# Patient Record
Sex: FEMALE | Race: WHITE | NOT HISPANIC OR LATINO | Employment: FULL TIME | URBAN - METROPOLITAN AREA
[De-identification: names, ages, dates, MRNs, and addresses within clinical notes are randomized per-mention and may not be internally consistent; named-entity substitution may affect disease eponyms.]

---

## 2018-01-15 NOTE — MISCELLANEOUS
Message   Recorded as Task   Date: 02/08/2016 01:25 PM, Created By: Aziza Dover   Task Name: Med Renewal Request   Assigned To: Eulalio Slade   Regarding Patient: Luisito Lopez, Status: Active   Comment:    LeonelbatoolMargie marshall - 08 Feb 2016 1:25 PM     TASK CREATED  Caller: Self; Renew Medication; [phone] (Home); [phone] (Work)  Refill Levothyroxine 125mcg to [facility] -- call [phone] if ? Morelia Castillo - 08 Feb 2016 5:43 PM     TASK REASSIGNED: Previously Assigned To [address]  I sent him 30 days worth of the prescription the patient is due for a checkup and blood work  She's not been seen here in a year  02/08/2016 Has no insurance till June, she will try to schedule before, not sure if she will be able to afford it  trb      Active Problems    1  Graves' disease (242 00) (E05 00)   2  Graves' disease with exophthalmos (242 00,376 21) (E05 00)   3  Hypertension (401 9) (I10)   4  Hypothyroidism (244 9) (E03 9)   5  Nicotine dependence (305 1) (F17 200)    Current Meds   1  Atenolol 25 MG Oral Tablet; TAKE 1 TABLET DAILY AS DIRECTED; Therapy: [id number] to ([facility]ruits)  Requested for: [id number]; Last   Rx:[id number] Ordered   2  Hydrochlorothiazide 25 MG Oral Tablet; TAKE 1 TABLET DAILY; Therapy: [id number] to ([facility]ruits)  Requested for: 49HRM8464; Last   Rx:[id number] Ordered   3  Levothyroxine Sodium 125 MCG Oral Tablet (Synthroid); TAKE 1 TABLET DAILY AS   DIRECTED; Therapy: [id number] to (Hadley Ferrari)  Requested for: 56JWA0466; Last   Rx:[id number] Ordered    Allergies    1  No Known Drug Allergies    Signatures   Electronically signed by :  Tremaine Wheatley, ; Feb 8 2016  6:10PM EST                       (Author)

## 2018-05-07 DIAGNOSIS — I10 HYPERTENSION, ESSENTIAL: Primary | ICD-10-CM

## 2018-05-07 DIAGNOSIS — E03.9 HYPOTHYROIDISM, UNSPECIFIED TYPE: ICD-10-CM

## 2018-05-07 RX ORDER — LEVOTHYROXINE SODIUM 0.12 MG/1
1 TABLET ORAL DAILY
COMMUNITY
Start: 2012-01-23 | End: 2018-05-07 | Stop reason: SDUPTHER

## 2018-05-07 RX ORDER — ATENOLOL 25 MG/1
1 TABLET ORAL DAILY
COMMUNITY
Start: 2012-01-23 | End: 2018-05-07 | Stop reason: SDUPTHER

## 2018-05-07 RX ORDER — ATENOLOL 25 MG/1
25 TABLET ORAL DAILY
Qty: 90 TABLET | Refills: 1 | Status: SHIPPED | OUTPATIENT
Start: 2018-05-07 | End: 2018-11-12 | Stop reason: SDUPTHER

## 2018-05-07 RX ORDER — HYDROCHLOROTHIAZIDE 25 MG/1
25 TABLET ORAL DAILY
Qty: 90 TABLET | Refills: 1 | Status: SHIPPED | OUTPATIENT
Start: 2018-05-07 | End: 2018-11-12 | Stop reason: SDUPTHER

## 2018-05-07 RX ORDER — LEVOTHYROXINE SODIUM 0.12 MG/1
125 TABLET ORAL DAILY
Qty: 90 TABLET | Refills: 1 | Status: SHIPPED | OUTPATIENT
Start: 2018-05-07 | End: 2018-11-12 | Stop reason: SDUPTHER

## 2018-05-07 RX ORDER — HYDROCHLOROTHIAZIDE 25 MG/1
1 TABLET ORAL DAILY
COMMUNITY
Start: 2012-01-23 | End: 2018-05-07 | Stop reason: SDUPTHER

## 2018-11-12 ENCOUNTER — TELEPHONE (OUTPATIENT)
Dept: FAMILY MEDICINE CLINIC | Facility: CLINIC | Age: 58
End: 2018-11-12

## 2018-11-12 DIAGNOSIS — I10 HYPERTENSION, ESSENTIAL: ICD-10-CM

## 2018-11-12 DIAGNOSIS — E03.9 HYPOTHYROIDISM, UNSPECIFIED TYPE: ICD-10-CM

## 2018-11-12 RX ORDER — LEVOTHYROXINE SODIUM 0.12 MG/1
125 TABLET ORAL DAILY
Qty: 30 TABLET | Refills: 0 | Status: SHIPPED | OUTPATIENT
Start: 2018-11-12 | End: 2018-12-05 | Stop reason: SDUPTHER

## 2018-11-12 RX ORDER — ATENOLOL 25 MG/1
25 TABLET ORAL DAILY
Qty: 30 TABLET | Refills: 0 | Status: SHIPPED | OUTPATIENT
Start: 2018-11-12 | End: 2018-12-05 | Stop reason: SDUPTHER

## 2018-11-12 RX ORDER — HYDROCHLOROTHIAZIDE 25 MG/1
25 TABLET ORAL DAILY
Qty: 30 TABLET | Refills: 0 | Status: SHIPPED | OUTPATIENT
Start: 2018-11-12 | End: 2018-12-05 | Stop reason: SDUPTHER

## 2018-11-12 NOTE — TELEPHONE ENCOUNTER
Called patient left message on machine, will request 30 day but needs to call office to schedule an appointment, has not been seen for 2 years  She will need to schedule before she gets anymore refills    katie

## 2018-11-12 NOTE — TELEPHONE ENCOUNTER
PT SAID SHE LEFT THE REQUEST ON THE RX LINE Friday AM AND NOTHING WAS CALLED IN TO MARTHA VENEGAS PT NEEDS ATENOLOL 25 MG AND HYDROCHLOROTHIAZIDE 25 MG AND LEVOTHYROXINE 125 MCG  ONLY HAS FOR TOMORROW

## 2018-12-05 ENCOUNTER — OFFICE VISIT (OUTPATIENT)
Dept: FAMILY MEDICINE CLINIC | Facility: CLINIC | Age: 58
End: 2018-12-05
Payer: COMMERCIAL

## 2018-12-05 VITALS
BODY MASS INDEX: 32.01 KG/M2 | DIASTOLIC BLOOD PRESSURE: 90 MMHG | HEART RATE: 68 BPM | SYSTOLIC BLOOD PRESSURE: 140 MMHG | WEIGHT: 211.2 LBS | OXYGEN SATURATION: 98 % | HEIGHT: 68 IN | RESPIRATION RATE: 14 BRPM | TEMPERATURE: 97.9 F

## 2018-12-05 DIAGNOSIS — F17.200 NICOTINE DEPENDENCE, UNCOMPLICATED, UNSPECIFIED NICOTINE PRODUCT TYPE: ICD-10-CM

## 2018-12-05 DIAGNOSIS — E66.9 OBESITY (BMI 30.0-34.9): ICD-10-CM

## 2018-12-05 DIAGNOSIS — I10 ESSENTIAL HYPERTENSION: Primary | ICD-10-CM

## 2018-12-05 DIAGNOSIS — E89.0 POSTOPERATIVE HYPOTHYROIDISM: ICD-10-CM

## 2018-12-05 DIAGNOSIS — E78.5 MILD HYPERLIPIDEMIA: ICD-10-CM

## 2018-12-05 PROCEDURE — 3008F BODY MASS INDEX DOCD: CPT | Performed by: FAMILY MEDICINE

## 2018-12-05 PROCEDURE — 99214 OFFICE O/P EST MOD 30 MIN: CPT | Performed by: FAMILY MEDICINE

## 2018-12-05 RX ORDER — ATENOLOL 50 MG/1
50 TABLET ORAL DAILY
Qty: 30 TABLET | Refills: 5 | Status: SHIPPED | OUTPATIENT
Start: 2018-12-05 | End: 2019-06-04 | Stop reason: SDUPTHER

## 2018-12-05 RX ORDER — HYDROCHLOROTHIAZIDE 25 MG/1
25 TABLET ORAL DAILY
Qty: 30 TABLET | Refills: 5 | Status: SHIPPED | OUTPATIENT
Start: 2018-12-05 | End: 2019-06-04 | Stop reason: SDUPTHER

## 2018-12-05 RX ORDER — LEVOTHYROXINE SODIUM 0.12 MG/1
125 TABLET ORAL DAILY
Qty: 30 TABLET | Refills: 5 | Status: SHIPPED | OUTPATIENT
Start: 2018-12-05 | End: 2019-06-04 | Stop reason: SDUPTHER

## 2018-12-05 NOTE — PROGRESS NOTES
Assessment/Plan:  Hypertension  The patient's blood pressure is not controlled on her current medication  Therefore, I am going to increase her atenolol to 50 mg daily  She will take 2 for 25 mg tablets once daily  I advised her to purchase a blood pressure cuff to monitor her blood pressure at home  She is overdue for lab work and will go for the testing as ordered as soon as possible  We will follow up closely with the results and will see her back as scheduled in 1 month  Postoperative hypothyroidism  The patient will continue with her levothyroxine  She will go for the up-to-date lab work as ordered  Will follow up closely with the results  Karel Galeazzi is refusing a screening mammogram   She is aware the risks of not having breast cancer screening including missing pre cancerous lesions that could result in development of breast cancer and possibly death  She is aware and still refuses the screening  Karel Galeazzi is refusing a colonoscopy, stool testing, or any type of colon cancer screening  She is aware of the risks of not screening including missing a pre cancerous polyp that could lead to the development of colon cancer and complications including death  She is aware of this and is still declining a colonoscopy  Diagnoses and all orders for this visit:    Essential hypertension  -     CBC and differential; Future  -     Comprehensive metabolic panel; Future  -     LDL cholesterol, direct; Future  -     Lipid panel; Future  -     Urinalysis with reflex to microscopic; Future  -     TSH, 3rd generation; Future  -     T4, free; Future  -     hydrochlorothiazide (HYDRODIURIL) 25 mg tablet; Take 1 tablet (25 mg total) by mouth daily  -     atenolol (TENORMIN) 50 mg tablet;  Take 1 tablet (50 mg total) by mouth daily  -     CBC and differential  -     Comprehensive metabolic panel  -     LDL cholesterol, direct  -     Lipid panel  -     Urinalysis with reflex to microscopic  - TSH, 3rd generation  -     T4, free    Postoperative hypothyroidism  -     TSH, 3rd generation; Future  -     T4, free; Future  -     levothyroxine 125 mcg tablet; Take 1 tablet (125 mcg total) by mouth daily  -     TSH, 3rd generation  -     T4, free    Mild hyperlipidemia  -     Comprehensive metabolic panel; Future  -     LDL cholesterol, direct; Future  -     Lipid panel; Future  -     Comprehensive metabolic panel  -     LDL cholesterol, direct  -     Lipid panel    Nicotine dependence, uncomplicated, unspecified nicotine product type  -     CT lung screening program; Future    Obesity (BMI 30 0-34  9)       Return in about 1 month (around 1/5/2019) for Recheck  Subjective:   Chief Complaint   Patient presents with    Follow-up     hypothyroidism, HTN        Patient ID: Salomon Nava is a 63 y o  female presents today for a routine checkup  Salomon Nava is a 63 y o  female who presents today for evaluation of her hypothyroidism, hypertension, and hyperlipidemia  She has not been seen in the office since 2016  She has been stressed out  She is feeling well  The patient denies any chest pain, shortness of breath, or palpitations  There is no edema  There are no headaches or visual changes  There is no lightheadedness, dizziness, or fainting spells  She has been having blurry vision  She stopped seeing her eye doctor and she is seeing the eye doctor at [address] [place] [address]  There is no dysphagia  She is refusing a flu shot  The patient currently denies any nausea, vomiting, or GERD symptoms  she has normal bowel movements and normal urine output  she has a normal appetite  Hypertension   This is a chronic problem  The current episode started more than 1 year ago  The problem is unchanged  The problem is uncontrolled   Pertinent negatives include no anxiety, blurred vision, chest pain, headaches, malaise/fatigue, neck pain, orthopnea, palpitations, peripheral edema, PND, shortness of breath or sweats  Past treatments include beta blockers and diuretics  The current treatment provides significant improvement  There are no compliance problems  The following portions of the patient's history were reviewed and updated as appropriate: allergies, current medications, past family history, past medical history, past social history, past surgical history and problem list   Patient Active Problem List   Diagnosis    Graves' disease with exophthalmos    Hypertension    Postoperative hypothyroidism    Mild hyperlipidemia    Nicotine dependence     Past Medical History:   Diagnosis Date    H/O thyroidectomy     HTN (hypertension)     Hyperlipidemia     Hypothyroidism, postop      Past Surgical History:   Procedure Laterality Date    THYROIDECTOMY  09/21/2010    TUBAL LIGATION      WISDOM TOOTH EXTRACTION       No Known Allergies  Family History   Problem Relation Age of Onset    Atrial fibrillation Father     Hypertension Father     Stroke Father     Kidney cancer Brother      Social History     Social History    Marital status:      Spouse name: N/A    Number of children: N/A    Years of education: N/A     Occupational History    full time      Social History Main Topics    Smoking status: Current Every Day Smoker     Packs/day: 1 00     Years: 40 00    Smokeless tobacco: Never Used      Comment: smoking a little less than a pack a day   Alcohol use No    Drug use: No    Sexual activity: Not on file     Other Topics Concern    Not on file     Social History Narrative    No narrative on file     No current outpatient prescriptions on file prior to visit  No current facility-administered medications on file prior to visit  Review of Systems   Constitutional: Negative  Negative for malaise/fatigue  HENT: Negative  Eyes: Negative  Negative for blurred vision  Respiratory: Negative  Negative for shortness of breath  Cardiovascular: Negative    Negative for chest pain, palpitations, orthopnea and PND  Gastrointestinal: Negative  Endocrine: Negative  Genitourinary: Negative  Musculoskeletal: Negative  Negative for neck pain  Skin: Negative  Allergic/Immunologic: Negative  Neurological: Negative  Negative for headaches  Hematological: Negative  Psychiatric/Behavioral: Negative  Objective:  Vitals:    12/05/18 1437 12/05/18 1543   BP: 170/80 140/90   BP Location: Left arm    Patient Position: Sitting    Cuff Size: Large    Pulse: 69 68   Resp: 14    Temp: 97 9 °F (36 6 °C)    TempSrc: Tympanic    SpO2: 98%    Weight: 95 8 kg (211 lb 3 2 oz)    Height: 5' 7 5" (1 715 m)      Body mass index is 32 59 kg/m²  Wt Readings from Last 3 Encounters:   12/05/18 95 8 kg (211 lb 3 2 oz)   11/10/16 96 3 kg (212 lb 4 oz)   03/22/16 97 2 kg (214 lb 4 oz)     Temp Readings from Last 3 Encounters:   12/05/18 97 9 °F (36 6 °C) (Tympanic)   11/10/16 (!) 97 °F (36 1 °C) (Tympanic)   03/22/16 98 4 °F (36 9 °C) (Tympanic)     BP Readings from Last 3 Encounters:   12/05/18 140/90   11/10/16 134/80   03/22/16 134/80     Pulse Readings from Last 3 Encounters:   12/05/18 68   11/10/16 64   03/22/16 68        Physical Exam   Constitutional: She is oriented to person, place, and time  She appears well-developed and well-nourished  HENT:   Head: Normocephalic and atraumatic  Mouth/Throat: No oropharyngeal exudate  Eyes: Pupils are equal, round, and reactive to light  Conjunctivae and EOM are normal    Neck: Normal range of motion  No JVD present  No tracheal deviation present  No thyromegaly present  Cardiovascular: Normal rate, regular rhythm, normal heart sounds and intact distal pulses  Exam reveals no gallop and no friction rub  No murmur heard  Pulmonary/Chest: Effort normal and breath sounds normal  No stridor  No respiratory distress  She has no wheezes  She has no rales  She exhibits no tenderness  Abdominal: Soft   Bowel sounds are normal  She exhibits no distension and no mass  There is no tenderness  There is no rebound and no guarding  Musculoskeletal: Normal range of motion  She exhibits no edema, tenderness or deformity  Lymphadenopathy:     She has no cervical adenopathy  Neurological: She is alert and oriented to person, place, and time  She has normal reflexes  No cranial nerve deficit  She exhibits normal muscle tone  Coordination normal    Skin: Skin is warm and dry  BMI Counseling: Body mass index is 32 59 kg/m²  Discussed the patient's BMI with her  The BMI is above average  BMI counseling and education was provided to the patient  Nutrition recommendations include reducing portion sizes, decreasing overall calorie intake, 3-5 servings of fruits/vegetables daily, decreasing soda and/or juice intake, moderation in carbohydrate intake, increasing intake of lean protein and reducing intake of saturated fat and trans fat  Exercise recommendations include vigorous aerobic physical activity for 75 minutes/week, exercising 3-5 times per week, joining a gym and strength training exercises

## 2018-12-06 NOTE — ASSESSMENT & PLAN NOTE
The patient will continue with her levothyroxine  She will go for the up-to-date lab work as ordered  Will follow up closely with the results

## 2018-12-06 NOTE — PATIENT INSTRUCTIONS

## 2018-12-06 NOTE — ASSESSMENT & PLAN NOTE
The patient's blood pressure is not controlled on her current medication  Therefore, I am going to increase her amlodipine to 50 mg daily  She will take 2 for 25 mg tablets once daily  I advised her to purchase a blood pressure cuff to monitor her blood pressure at home  She is overdue for lab work and will go for the testing as ordered as soon as possible  We will follow up closely with the results and will see her back as scheduled in 1 month

## 2019-01-09 ENCOUNTER — OFFICE VISIT (OUTPATIENT)
Dept: FAMILY MEDICINE CLINIC | Facility: CLINIC | Age: 59
End: 2019-01-09
Payer: COMMERCIAL

## 2019-01-09 VITALS
TEMPERATURE: 98.6 F | OXYGEN SATURATION: 96 % | RESPIRATION RATE: 15 BRPM | SYSTOLIC BLOOD PRESSURE: 120 MMHG | HEART RATE: 68 BPM | WEIGHT: 210 LBS | DIASTOLIC BLOOD PRESSURE: 70 MMHG | HEIGHT: 68 IN | BODY MASS INDEX: 31.83 KG/M2

## 2019-01-09 DIAGNOSIS — I10 ESSENTIAL HYPERTENSION: Primary | ICD-10-CM

## 2019-01-09 PROCEDURE — 3074F SYST BP LT 130 MM HG: CPT | Performed by: FAMILY MEDICINE

## 2019-01-09 PROCEDURE — 3008F BODY MASS INDEX DOCD: CPT | Performed by: FAMILY MEDICINE

## 2019-01-09 PROCEDURE — 3078F DIAST BP <80 MM HG: CPT | Performed by: FAMILY MEDICINE

## 2019-01-09 PROCEDURE — 99212 OFFICE O/P EST SF 10 MIN: CPT | Performed by: FAMILY MEDICINE

## 2019-01-09 NOTE — PROGRESS NOTES
Assessment/Plan:  Hypertension  The patient's blood pressure is now well controlled with the higher dose of the atenolol 50 mg daily  We will maintain her on this current dose  She will continue to work on her diet and exercise  She will go for the lab work as ordered previously  She was again encouraged to quit smoking  We will see her back in the office as scheduled  Diagnoses and all orders for this visit:    Essential hypertension       Return in about 6 months (around 7/9/2019) for Recheck  Subjective:   Chief Complaint   Patient presents with    Follow-up     1 month follow up for HTN        Patient ID: Berenice Guy is a 63 y o  female presents today for a routine checkup her hypertension  Berenice Guy is a 63 y o  female who presents today for follow-up of her hypertension  She feels fine on the medication  The patient denies any chest pain, shortness of breath, or palpitations  There is no edema  There are no headaches or visual changes  There is no lightheadedness, dizziness, or fainting spells  There are no headaches  She has to get her blood work done  There are no recent illness  She had a URI but is better  Hypertension   This is a chronic problem  The current episode started more than 1 year ago  The problem is unchanged  The problem is controlled  Pertinent negatives include no anxiety, blurred vision, chest pain, headaches, malaise/fatigue, neck pain, orthopnea, palpitations, peripheral edema, PND, shortness of breath or sweats  Past treatments include diuretics and beta blockers  The current treatment provides significant improvement       The following portions of the patient's history were reviewed and updated as appropriate: allergies, current medications, past family history, past medical history, past social history, past surgical history and problem list   Patient Active Problem List   Diagnosis    Graves' disease with exophthalmos    Hypertension  Postoperative hypothyroidism    Mild hyperlipidemia    Nicotine dependence     Past Medical History:   Diagnosis Date    H/O thyroidectomy     HTN (hypertension)     Hyperlipidemia     Hypothyroidism, postop      Past Surgical History:   Procedure Laterality Date    THYROIDECTOMY  09/21/2010    TUBAL LIGATION      WISDOM TOOTH EXTRACTION       No Known Allergies  Family History   Problem Relation Age of Onset    Atrial fibrillation Father     Hypertension Father     Stroke Father     Kidney cancer Brother      Social History     Social History    Marital status:      Spouse name: N/A    Number of children: N/A    Years of education: N/A     Occupational History    full time      Social History Main Topics    Smoking status: Current Every Day Smoker     Packs/day: 1 00     Years: 40 00    Smokeless tobacco: Never Used      Comment: smoking a little less than a pack a day   Alcohol use No    Drug use: No    Sexual activity: Not on file     Other Topics Concern    Not on file     Social History Narrative    No narrative on file     Current Outpatient Prescriptions on File Prior to Visit   Medication Sig Dispense Refill    atenolol (TENORMIN) 50 mg tablet Take 1 tablet (50 mg total) by mouth daily 30 tablet 5    hydrochlorothiazide (HYDRODIURIL) 25 mg tablet Take 1 tablet (25 mg total) by mouth daily 30 tablet 5    levothyroxine 125 mcg tablet Take 1 tablet (125 mcg total) by mouth daily 30 tablet 5     No current facility-administered medications on file prior to visit  Review of Systems   Constitutional: Negative  Negative for malaise/fatigue  HENT: Negative  Eyes: Negative  Negative for blurred vision  Respiratory: Negative  Negative for shortness of breath  Cardiovascular: Negative  Negative for chest pain, palpitations, orthopnea and PND  Gastrointestinal: Negative  Endocrine: Negative  Genitourinary: Negative  Musculoskeletal: Negative  Negative for neck pain  Skin: Negative  Allergic/Immunologic: Negative  Neurological: Negative  Negative for headaches  Hematological: Negative  Psychiatric/Behavioral: Negative  Objective:  Vitals:    01/09/19 1435 01/09/19 1518   BP: 116/70 120/70   BP Location: Left arm    Patient Position: Sitting    Cuff Size: Large    Pulse: 61 68   Resp: 15    Temp: 98 6 °F (37 °C)    TempSrc: Tympanic    SpO2: 96%    Weight: 95 3 kg (210 lb)    Height: 5' 7 5" (1 715 m)      Body mass index is 32 41 kg/m²  Wt Readings from Last 3 Encounters:   01/09/19 95 3 kg (210 lb)   12/05/18 95 8 kg (211 lb 3 2 oz)   11/10/16 96 3 kg (212 lb 4 oz)     Temp Readings from Last 3 Encounters:   01/09/19 98 6 °F (37 °C) (Tympanic)   12/05/18 97 9 °F (36 6 °C) (Tympanic)   11/10/16 (!) 97 °F (36 1 °C) (Tympanic)     BP Readings from Last 3 Encounters:   01/09/19 120/70   12/05/18 140/90   11/10/16 134/80     Pulse Readings from Last 3 Encounters:   01/09/19 68   12/05/18 68   11/10/16 64        Physical Exam   Constitutional: She is oriented to person, place, and time  She appears well-developed and well-nourished  HENT:   Head: Normocephalic and atraumatic  Mouth/Throat: No oropharyngeal exudate  Eyes: Pupils are equal, round, and reactive to light  Conjunctivae and EOM are normal    Neck: Normal range of motion  No JVD present  No tracheal deviation present  No thyromegaly present  Cardiovascular: Normal rate, regular rhythm, normal heart sounds and intact distal pulses  Exam reveals no gallop and no friction rub  No murmur heard  Pulmonary/Chest: Effort normal and breath sounds normal  No stridor  No respiratory distress  She has no wheezes  She has no rales  She exhibits no tenderness  Abdominal: Soft  Bowel sounds are normal  She exhibits no distension and no mass  There is no tenderness  There is no rebound and no guarding  Musculoskeletal: Normal range of motion   She exhibits no edema, tenderness or deformity  Lymphadenopathy:     She has no cervical adenopathy  Neurological: She is alert and oriented to person, place, and time  She has normal reflexes  No cranial nerve deficit  She exhibits normal muscle tone  Coordination normal    Skin: Skin is warm and dry  No visits with results within 2 Month(s) from this visit     Latest known visit with results is:   Lab Conversion - Encounter on 11/02/2016   Component Date Value    TSH 3RD GENERATON 11/01/2016 1 33

## 2019-01-10 NOTE — ASSESSMENT & PLAN NOTE
The patient's blood pressure is now well controlled with the higher dose of the atenolol 50 mg daily  We will maintain her on this current dose  She will continue to work on her diet and exercise  She will go for the lab work as ordered previously  She was again encouraged to quit smoking  We will see her back in the office as scheduled

## 2019-06-04 DIAGNOSIS — I10 ESSENTIAL HYPERTENSION: ICD-10-CM

## 2019-06-04 DIAGNOSIS — E89.0 POSTOPERATIVE HYPOTHYROIDISM: ICD-10-CM

## 2019-06-04 RX ORDER — ATENOLOL 50 MG/1
50 TABLET ORAL DAILY
Qty: 30 TABLET | Refills: 5 | Status: SHIPPED | OUTPATIENT
Start: 2019-06-04 | End: 2019-12-03 | Stop reason: SDUPTHER

## 2019-06-04 RX ORDER — HYDROCHLOROTHIAZIDE 25 MG/1
25 TABLET ORAL DAILY
Qty: 30 TABLET | Refills: 5 | Status: SHIPPED | OUTPATIENT
Start: 2019-06-04 | End: 2019-12-03 | Stop reason: SDUPTHER

## 2019-06-04 RX ORDER — LEVOTHYROXINE SODIUM 0.12 MG/1
125 TABLET ORAL DAILY
Qty: 30 TABLET | Refills: 5 | Status: SHIPPED | OUTPATIENT
Start: 2019-06-04 | End: 2019-12-03 | Stop reason: SDUPTHER

## 2019-12-03 DIAGNOSIS — E89.0 POSTOPERATIVE HYPOTHYROIDISM: ICD-10-CM

## 2019-12-03 DIAGNOSIS — I10 ESSENTIAL HYPERTENSION: ICD-10-CM

## 2019-12-03 RX ORDER — LEVOTHYROXINE SODIUM 0.12 MG/1
125 TABLET ORAL DAILY
Qty: 30 TABLET | Refills: 5 | Status: SHIPPED | OUTPATIENT
Start: 2019-12-03 | End: 2020-06-03 | Stop reason: SDUPTHER

## 2019-12-03 RX ORDER — HYDROCHLOROTHIAZIDE 25 MG/1
25 TABLET ORAL DAILY
Qty: 30 TABLET | Refills: 5 | Status: SHIPPED | OUTPATIENT
Start: 2019-12-03 | End: 2020-06-03 | Stop reason: SDUPTHER

## 2019-12-03 RX ORDER — ATENOLOL 50 MG/1
50 TABLET ORAL DAILY
Qty: 30 TABLET | Refills: 5 | Status: SHIPPED | OUTPATIENT
Start: 2019-12-03 | End: 2020-06-03 | Stop reason: SDUPTHER

## 2019-12-20 ENCOUNTER — TELEPHONE (OUTPATIENT)
Dept: FAMILY MEDICINE CLINIC | Facility: CLINIC | Age: 59
End: 2019-12-20

## 2019-12-20 NOTE — TELEPHONE ENCOUNTER
Spoke to patient and she stated she will schedule a physical when ever she has her schedule with her and that she does not want a mammo ordered and she is not interested in getting one

## 2020-06-03 ENCOUNTER — TELEPHONE (OUTPATIENT)
Dept: FAMILY MEDICINE CLINIC | Facility: CLINIC | Age: 60
End: 2020-06-03

## 2020-06-03 DIAGNOSIS — I10 ESSENTIAL HYPERTENSION: ICD-10-CM

## 2020-06-03 DIAGNOSIS — E89.0 POSTOPERATIVE HYPOTHYROIDISM: ICD-10-CM

## 2020-06-03 RX ORDER — LEVOTHYROXINE SODIUM 0.12 MG/1
125 TABLET ORAL DAILY
Qty: 30 TABLET | Refills: 5 | Status: SHIPPED | OUTPATIENT
Start: 2020-06-03 | End: 2020-11-17 | Stop reason: SDUPTHER

## 2020-06-03 RX ORDER — HYDROCHLOROTHIAZIDE 25 MG/1
25 TABLET ORAL DAILY
Qty: 30 TABLET | Refills: 5 | Status: SHIPPED | OUTPATIENT
Start: 2020-06-03 | End: 2020-11-17 | Stop reason: SDUPTHER

## 2020-06-03 RX ORDER — ATENOLOL 50 MG/1
50 TABLET ORAL DAILY
Qty: 30 TABLET | Refills: 5 | Status: SHIPPED | OUTPATIENT
Start: 2020-06-03 | End: 2020-11-17 | Stop reason: SDUPTHER

## 2020-06-04 RX ORDER — ATENOLOL 50 MG/1
TABLET ORAL
Qty: 30 TABLET | Refills: 5 | OUTPATIENT
Start: 2020-06-04

## 2020-06-10 ENCOUNTER — OFFICE VISIT (OUTPATIENT)
Dept: FAMILY MEDICINE CLINIC | Facility: CLINIC | Age: 60
End: 2020-06-10
Payer: COMMERCIAL

## 2020-06-10 VITALS
SYSTOLIC BLOOD PRESSURE: 140 MMHG | TEMPERATURE: 98.2 F | WEIGHT: 214.4 LBS | HEART RATE: 68 BPM | BODY MASS INDEX: 32.49 KG/M2 | HEIGHT: 68 IN | DIASTOLIC BLOOD PRESSURE: 80 MMHG | OXYGEN SATURATION: 97 %

## 2020-06-10 DIAGNOSIS — Z13.1 SCREENING FOR DIABETES MELLITUS: ICD-10-CM

## 2020-06-10 DIAGNOSIS — Z12.31 ENCOUNTER FOR SCREENING MAMMOGRAM FOR BREAST CANCER: ICD-10-CM

## 2020-06-10 DIAGNOSIS — Z13.6 SCREENING FOR CARDIOVASCULAR CONDITION: ICD-10-CM

## 2020-06-10 DIAGNOSIS — E89.0 POSTOPERATIVE HYPOTHYROIDISM: ICD-10-CM

## 2020-06-10 DIAGNOSIS — Z00.00 HEALTH MAINTENANCE EXAMINATION: Primary | ICD-10-CM

## 2020-06-10 DIAGNOSIS — F17.200 NICOTINE DEPENDENCE, UNCOMPLICATED, UNSPECIFIED NICOTINE PRODUCT TYPE: ICD-10-CM

## 2020-06-10 PROCEDURE — 3077F SYST BP >= 140 MM HG: CPT | Performed by: FAMILY MEDICINE

## 2020-06-10 PROCEDURE — 99396 PREV VISIT EST AGE 40-64: CPT | Performed by: FAMILY MEDICINE

## 2020-06-10 PROCEDURE — 3079F DIAST BP 80-89 MM HG: CPT | Performed by: FAMILY MEDICINE

## 2020-06-10 PROCEDURE — 3008F BODY MASS INDEX DOCD: CPT | Performed by: FAMILY MEDICINE

## 2020-11-13 DIAGNOSIS — I10 ESSENTIAL HYPERTENSION: ICD-10-CM

## 2020-11-13 RX ORDER — ATENOLOL 50 MG/1
TABLET ORAL
Qty: 30 TABLET | Refills: 5 | OUTPATIENT
Start: 2020-11-13

## 2020-11-17 DIAGNOSIS — I10 ESSENTIAL HYPERTENSION: ICD-10-CM

## 2020-11-17 DIAGNOSIS — E89.0 POSTOPERATIVE HYPOTHYROIDISM: ICD-10-CM

## 2020-11-18 RX ORDER — LEVOTHYROXINE SODIUM 0.12 MG/1
125 TABLET ORAL DAILY
Qty: 30 TABLET | Refills: 5 | Status: SHIPPED | OUTPATIENT
Start: 2020-11-18 | End: 2021-05-25 | Stop reason: SDUPTHER

## 2020-11-18 RX ORDER — ATENOLOL 50 MG/1
50 TABLET ORAL DAILY
Qty: 30 TABLET | Refills: 5 | Status: SHIPPED | OUTPATIENT
Start: 2020-11-18 | End: 2021-05-25 | Stop reason: SDUPTHER

## 2020-11-18 RX ORDER — HYDROCHLOROTHIAZIDE 25 MG/1
25 TABLET ORAL DAILY
Qty: 30 TABLET | Refills: 5 | Status: SHIPPED | OUTPATIENT
Start: 2020-11-18 | End: 2021-05-25 | Stop reason: SDUPTHER

## 2021-01-22 ENCOUNTER — VBI (OUTPATIENT)
Dept: ADMINISTRATIVE | Facility: OTHER | Age: 61
End: 2021-01-22

## 2021-02-04 ENCOUNTER — VBI (OUTPATIENT)
Dept: ADMINISTRATIVE | Facility: OTHER | Age: 61
End: 2021-02-04

## 2021-02-16 ENCOUNTER — VBI (OUTPATIENT)
Dept: ADMINISTRATIVE | Facility: OTHER | Age: 61
End: 2021-02-16

## 2021-05-23 DIAGNOSIS — E89.0 POSTOPERATIVE HYPOTHYROIDISM: ICD-10-CM

## 2021-05-23 DIAGNOSIS — I10 ESSENTIAL HYPERTENSION: ICD-10-CM

## 2021-05-25 RX ORDER — LEVOTHYROXINE SODIUM 0.12 MG/1
TABLET ORAL
Qty: 30 TABLET | Refills: 5 | Status: SHIPPED | OUTPATIENT
Start: 2021-05-25 | End: 2021-11-22 | Stop reason: SDUPTHER

## 2021-05-25 RX ORDER — ATENOLOL 50 MG/1
TABLET ORAL
Qty: 30 TABLET | Refills: 5 | Status: SHIPPED | OUTPATIENT
Start: 2021-05-25 | End: 2021-11-22 | Stop reason: SDUPTHER

## 2021-05-25 RX ORDER — HYDROCHLOROTHIAZIDE 25 MG/1
TABLET ORAL
Qty: 30 TABLET | Refills: 5 | Status: SHIPPED | OUTPATIENT
Start: 2021-05-25 | End: 2021-11-22 | Stop reason: SDUPTHER

## 2021-11-22 DIAGNOSIS — I10 ESSENTIAL HYPERTENSION: ICD-10-CM

## 2021-11-22 DIAGNOSIS — E89.0 POSTOPERATIVE HYPOTHYROIDISM: ICD-10-CM

## 2021-11-22 RX ORDER — ATENOLOL 50 MG/1
50 TABLET ORAL DAILY
Qty: 30 TABLET | Refills: 5 | Status: SHIPPED | OUTPATIENT
Start: 2021-11-22 | End: 2022-05-22

## 2021-11-22 RX ORDER — HYDROCHLOROTHIAZIDE 25 MG/1
25 TABLET ORAL DAILY
Qty: 30 TABLET | Refills: 5 | Status: SHIPPED | OUTPATIENT
Start: 2021-11-22 | End: 2022-05-20

## 2021-11-22 RX ORDER — LEVOTHYROXINE SODIUM 0.12 MG/1
125 TABLET ORAL DAILY
Qty: 30 TABLET | Refills: 5 | Status: SHIPPED | OUTPATIENT
Start: 2021-11-22 | End: 2022-05-20

## 2022-06-17 DIAGNOSIS — I10 ESSENTIAL HYPERTENSION: ICD-10-CM

## 2022-06-17 RX ORDER — ATENOLOL 50 MG/1
TABLET ORAL
Qty: 30 TABLET | Refills: 0 | OUTPATIENT
Start: 2022-06-17

## 2022-06-17 RX ORDER — ATENOLOL 50 MG/1
50 TABLET ORAL DAILY
Qty: 30 TABLET | Refills: 0 | Status: SHIPPED | OUTPATIENT
Start: 2022-06-17 | End: 2022-06-27 | Stop reason: SDUPTHER

## 2022-06-17 NOTE — TELEPHONE ENCOUNTER
Please refill this med for Joann She has an appt on 6/27 with Dr Good Presley and needs her meds    Thank you

## 2022-06-27 ENCOUNTER — OFFICE VISIT (OUTPATIENT)
Dept: FAMILY MEDICINE CLINIC | Facility: CLINIC | Age: 62
End: 2022-06-27

## 2022-06-27 VITALS
HEART RATE: 68 BPM | RESPIRATION RATE: 18 BRPM | SYSTOLIC BLOOD PRESSURE: 124 MMHG | OXYGEN SATURATION: 98 % | DIASTOLIC BLOOD PRESSURE: 84 MMHG | HEIGHT: 67 IN | WEIGHT: 202.4 LBS | TEMPERATURE: 98.4 F | BODY MASS INDEX: 31.77 KG/M2

## 2022-06-27 DIAGNOSIS — E89.0 POSTOPERATIVE HYPOTHYROIDISM: ICD-10-CM

## 2022-06-27 DIAGNOSIS — E78.5 MILD HYPERLIPIDEMIA: ICD-10-CM

## 2022-06-27 DIAGNOSIS — I10 PRIMARY HYPERTENSION: Primary | ICD-10-CM

## 2022-06-27 DIAGNOSIS — F17.200 NICOTINE DEPENDENCE, UNCOMPLICATED, UNSPECIFIED NICOTINE PRODUCT TYPE: ICD-10-CM

## 2022-06-27 PROCEDURE — 99214 OFFICE O/P EST MOD 30 MIN: CPT | Performed by: FAMILY MEDICINE

## 2022-06-27 RX ORDER — ATENOLOL 50 MG/1
50 TABLET ORAL DAILY
Qty: 90 TABLET | Refills: 1 | Status: SHIPPED | OUTPATIENT
Start: 2022-06-27

## 2022-06-27 NOTE — PROGRESS NOTES
Assessment/Plan:  Problem List Items Addressed This Visit        Endocrine    Postoperative hypothyroidism     The patient is stable on the current dose of her levothyroxine  She will go for the lab work as ordered  Relevant Medications    atenolol (TENORMIN) 50 mg tablet    Other Relevant Orders    T4, free    TSH, 3rd generation       Cardiovascular and Mediastinum    Hypertension - Primary     The patient's blood pressure was stable on her current medication  She will continue with the current dose of her medicine  She is strongly encouraged to quit smoking  She is strongly encouraged to go for the lab work as ordered  She is refusing any other testing besides the testing ordered today at this point  She will be scheduling a physical in 6 months  Relevant Medications    atenolol (TENORMIN) 50 mg tablet    Other Relevant Orders    Comprehensive metabolic panel    T4, free    TSH, 3rd generation    Lipid Panel with Direct LDL reflex       Other    Mild hyperlipidemia    Relevant Orders    Comprehensive metabolic panel    T4, free    TSH, 3rd generation    Lipid Panel with Direct LDL reflex    Nicotine dependence     Tobacco Cessation Counseling: Tobacco cessation counseling and education was provided  The patient is sincerely urged to quit consumption of tobacco  She is not ready to quit tobacco  The numerous health risks of tobacco consumption were discussed  If she decides to quit, there are a number of helpful adjunctive aids, and she can see me to discuss nicotine replacement therapy, chantix, or bupropion anytime in the future  Beverly Cabrera is refusing a screening mammogram   She is aware the risks of not having breast cancer screening including missing pre cancerous lesions that could result in development of breast cancer and possibly death  She is aware and still refuses the screening      Beverly Cabrera is refusing a colonoscopy, stool testing, or any type of colon cancer screening  She is aware of the risks of not screening including missing a pre cancerous polyp that could lead to the development of colon cancer and complications including death  She is aware of this and is still declining a colonoscopy  Return in about 6 months (around 12/27/2022) for Annual physical    I spent 20 minutes during the visit reviewing the history from the patient, performing the examination, discussing the findings with the patient, providing counseling and education, and making a plan  I spent 10 minutes ordering referrals and testing and documenting  Subjective:   Chief Complaint   Patient presents with    Follow-up     Check up hypertension        Patient ID: Caleb Mantilla is a 65 y o  female presents today for a routine checkup of her hypertension and hypothyroidism  Caleb Mantilla is a 65 y o  female presents today for follow-up of her hypertension and hypothyroidism  She is feeling well  The patient denies any chest pain, shortness of breath, or palpitations  There is no edema  There are no headaches or visual changes  There is no lightheadedness, dizziness, or fainting spells  She denies any dyspnea on exertion  She is still smoking about 3/4 of a pack daily  She drinks too much coffee- 2 pots of coffee  The patient currently denies any nausea, vomiting, or GERD symptoms  she has normal bowel movements and normal urine output  she has a normal appetite  She is refusing a colonoscopy  She is going to consider a cologuard  She does not need a refill  Hypertension  This is a chronic problem  The current episode started more than 1 year ago  The problem is unchanged  The problem is controlled  Pertinent negatives include no anxiety, blurred vision, chest pain, headaches, malaise/fatigue, neck pain, orthopnea, palpitations, peripheral edema, PND, shortness of breath or sweats       The following portions of the patient's history were reviewed and updated as appropriate: allergies, current medications, past family history, past medical history, past social history, past surgical history and problem list   Patient Active Problem List   Diagnosis    Graves' disease with exophthalmos    Hypertension    Postoperative hypothyroidism    Mild hyperlipidemia    Nicotine dependence     Past Medical History:   Diagnosis Date    H/O thyroidectomy     HTN (hypertension)     Hyperlipidemia     Hypothyroidism, postop      Past Surgical History:   Procedure Laterality Date    THYROIDECTOMY  09/21/2010    TUBAL LIGATION      WISDOM TOOTH EXTRACTION       No Known Allergies  Family History   Problem Relation Age of Onset    Atrial fibrillation Father     Hypertension Father     Stroke Father     Kidney cancer Brother      Social History     Socioeconomic History    Marital status:      Spouse name: Not on file    Number of children: Not on file    Years of education: Not on file    Highest education level: Not on file   Occupational History    Occupation: full time   Tobacco Use    Smoking status: Current Every Day Smoker     Packs/day: 1 00     Years: 40 00     Pack years: 40 00    Smokeless tobacco: Never Used    Tobacco comment: smoking a little less than a pack a day       Vaping Use    Vaping Use: Never used   Substance and Sexual Activity    Alcohol use: No    Drug use: No    Sexual activity: Not Currently   Other Topics Concern    Not on file   Social History Narrative    Not on file     Social Determinants of Health     Financial Resource Strain: Not on file   Food Insecurity: Not on file   Transportation Needs: Not on file   Physical Activity: Not on file   Stress: Not on file   Social Connections: Not on file   Intimate Partner Violence: Not At Risk    Fear of Current or Ex-Partner: No    Emotionally Abused: No    Physically Abused: No    Sexually Abused: No   Housing Stability: Not on file     Current Outpatient Medications on File Prior to Visit   Medication Sig Dispense Refill    hydrochlorothiazide (HYDRODIURIL) 25 mg tablet take 1 tablet by mouth once daily 30 tablet 5    levothyroxine 125 mcg tablet take 1 tablet by mouth once daily 30 tablet 5     No current facility-administered medications on file prior to visit  Review of Systems   Constitutional: Negative  Negative for malaise/fatigue  HENT: Negative  Eyes: Negative  Negative for blurred vision  Respiratory: Negative  Negative for shortness of breath  Cardiovascular: Negative  Negative for chest pain, palpitations, orthopnea and PND  Gastrointestinal: Negative  Endocrine: Negative  Genitourinary: Negative  Musculoskeletal: Negative  Negative for neck pain  Skin: Negative  Allergic/Immunologic: Negative  Neurological: Negative  Negative for headaches  Hematological: Negative  Psychiatric/Behavioral: Negative  Objective:  Vitals:    06/27/22 1431 06/27/22 1502   BP: 130/76 124/84   BP Location: Right arm    Patient Position: Sitting    Cuff Size: Large    Pulse: 62 68   Resp: 18    Temp: 98 4 °F (36 9 °C)    TempSrc: Tympanic    SpO2: 98%    Weight: 91 8 kg (202 lb 6 4 oz)    Height: 5' 7" (1 702 m)      Body mass index is 31 7 kg/m²  Physical Exam  Constitutional:       Appearance: She is well-developed  HENT:      Head: Normocephalic and atraumatic  Mouth/Throat:      Pharynx: No oropharyngeal exudate  Eyes:      Conjunctiva/sclera: Conjunctivae normal       Pupils: Pupils are equal, round, and reactive to light  Neck:      Thyroid: No thyromegaly  Vascular: No JVD  Trachea: No tracheal deviation  Cardiovascular:      Rate and Rhythm: Normal rate and regular rhythm  Heart sounds: Normal heart sounds  No murmur heard  No friction rub  No gallop  Pulmonary:      Effort: Pulmonary effort is normal  No respiratory distress  Breath sounds: Normal breath sounds  No stridor  No wheezing or rales  Chest:      Chest wall: No tenderness  Abdominal:      General: Bowel sounds are normal  There is no distension  Palpations: Abdomen is soft  There is no mass  Tenderness: There is no abdominal tenderness  There is no guarding or rebound  Musculoskeletal:         General: No tenderness or deformity  Normal range of motion  Cervical back: Normal range of motion  Lymphadenopathy:      Cervical: No cervical adenopathy  Skin:     General: Skin is warm and dry  Neurological:      Mental Status: She is alert and oriented to person, place, and time  Cranial Nerves: No cranial nerve deficit  Motor: No abnormal muscle tone  Coordination: Coordination normal       Deep Tendon Reflexes: Reflexes are normal and symmetric  Reflexes normal            BMI Counseling: Body mass index is 31 7 kg/m²  The BMI is above normal  Nutrition recommendations include decreasing portion sizes, encouraging healthy choices of fruits and vegetables, decreasing fast food intake, consuming healthier snacks, limiting drinks that contain sugar, moderation in carbohydrate intake, increasing intake of lean protein, reducing intake of saturated and trans fat and reducing intake of cholesterol  Exercise recommendations include exercising 3-5 times per week and strength training exercises  No pharmacotherapy was ordered  Patient referred to PCP  Rationale for BMI follow-up plan is due to patient being overweight or obese  Depression Screening and Follow-up Plan: Patient was screened for depression during today's encounter  They screened negative with a PHQ-2 score of 0  Tobacco Cessation Counseling: Tobacco cessation counseling was provided   The patient is sincerely urged to quit consumption of tobacco  She is not ready to quit tobacco

## 2022-06-28 NOTE — ASSESSMENT & PLAN NOTE
The patient is stable on the current dose of her levothyroxine  She will go for the lab work as ordered

## 2022-07-06 LAB
ALBUMIN SERPL-MCNC: 4.2 G/DL (ref 3.6–5.1)
ALBUMIN/GLOB SERPL: 1.7 (CALC) (ref 1–2.5)
ALP SERPL-CCNC: 92 U/L (ref 37–153)
ALT SERPL-CCNC: 10 U/L (ref 6–29)
AST SERPL-CCNC: 10 U/L (ref 10–35)
BILIRUB SERPL-MCNC: 0.6 MG/DL (ref 0.2–1.2)
BUN SERPL-MCNC: 15 MG/DL (ref 7–25)
BUN/CREAT SERPL: NORMAL (CALC) (ref 6–22)
CALCIUM SERPL-MCNC: 9.7 MG/DL (ref 8.6–10.4)
CHLORIDE SERPL-SCNC: 102 MMOL/L (ref 98–110)
CHOLEST SERPL-MCNC: 194 MG/DL
CHOLEST/HDLC SERPL: 4 (CALC)
CO2 SERPL-SCNC: 32 MMOL/L (ref 20–32)
CREAT SERPL-MCNC: 0.52 MG/DL (ref 0.5–0.99)
GLOBULIN SER CALC-MCNC: 2.5 G/DL (CALC) (ref 1.9–3.7)
GLUCOSE SERPL-MCNC: 84 MG/DL (ref 65–99)
HDLC SERPL-MCNC: 48 MG/DL
LDLC SERPL CALC-MCNC: 121 MG/DL (CALC)
NONHDLC SERPL-MCNC: 146 MG/DL (CALC)
POTASSIUM SERPL-SCNC: 3.8 MMOL/L (ref 3.5–5.3)
PROT SERPL-MCNC: 6.7 G/DL (ref 6.1–8.1)
SL AMB EGFR AFRICAN AMERICAN: 120 ML/MIN/1.73M2
SL AMB EGFR NON AFRICAN AMERICAN: 103 ML/MIN/1.73M2
SODIUM SERPL-SCNC: 141 MMOL/L (ref 135–146)
T4 FREE SERPL-MCNC: 1.3 NG/DL (ref 0.8–1.8)
TRIGL SERPL-MCNC: 136 MG/DL
TSH SERPL-ACNC: 0.49 MIU/L (ref 0.4–4.5)

## 2022-11-16 DIAGNOSIS — I10 ESSENTIAL HYPERTENSION: ICD-10-CM

## 2022-11-16 DIAGNOSIS — E89.0 POSTOPERATIVE HYPOTHYROIDISM: ICD-10-CM

## 2022-11-16 RX ORDER — HYDROCHLOROTHIAZIDE 25 MG/1
TABLET ORAL
Qty: 30 TABLET | Refills: 5 | Status: SHIPPED | OUTPATIENT
Start: 2022-11-16

## 2022-11-16 RX ORDER — LEVOTHYROXINE SODIUM 0.12 MG/1
TABLET ORAL
Qty: 30 TABLET | Refills: 5 | Status: SHIPPED | OUTPATIENT
Start: 2022-11-16

## 2023-01-17 DIAGNOSIS — I10 PRIMARY HYPERTENSION: ICD-10-CM

## 2023-01-17 RX ORDER — ATENOLOL 50 MG/1
50 TABLET ORAL DAILY
Qty: 30 TABLET | Refills: 3 | Status: SHIPPED | OUTPATIENT
Start: 2023-01-17

## 2023-05-22 DIAGNOSIS — I10 PRIMARY HYPERTENSION: ICD-10-CM

## 2023-05-22 DIAGNOSIS — E89.0 POSTOPERATIVE HYPOTHYROIDISM: ICD-10-CM

## 2023-05-22 DIAGNOSIS — I10 ESSENTIAL HYPERTENSION: ICD-10-CM

## 2023-05-22 RX ORDER — LEVOTHYROXINE SODIUM 0.12 MG/1
125 TABLET ORAL DAILY
Qty: 30 TABLET | Refills: 3 | Status: SHIPPED | OUTPATIENT
Start: 2023-05-22 | End: 2023-06-28 | Stop reason: SDUPTHER

## 2023-05-22 RX ORDER — HYDROCHLOROTHIAZIDE 25 MG/1
25 TABLET ORAL DAILY
Qty: 30 TABLET | Refills: 3 | Status: SHIPPED | OUTPATIENT
Start: 2023-05-22 | End: 2023-06-28 | Stop reason: SDUPTHER

## 2023-05-22 RX ORDER — ATENOLOL 50 MG/1
50 TABLET ORAL DAILY
Qty: 30 TABLET | Refills: 3 | Status: SHIPPED | OUTPATIENT
Start: 2023-05-22 | End: 2023-06-28 | Stop reason: SDUPTHER

## 2023-06-22 ENCOUNTER — RA CDI HCC (OUTPATIENT)
Dept: OTHER | Facility: HOSPITAL | Age: 63
End: 2023-06-22

## 2023-06-22 NOTE — PROGRESS NOTES
[facility]  coding opportunities       Chart reviewed, no opportunity found: CHART REVIEWED, NO OPPORTUNITY FOUND        Patients Insurance        Commercial Insurance: [employer]

## 2023-06-28 ENCOUNTER — OFFICE VISIT (OUTPATIENT)
Dept: FAMILY MEDICINE CLINIC | Facility: CLINIC | Age: 63
End: 2023-06-28
Payer: COMMERCIAL

## 2023-06-28 VITALS
RESPIRATION RATE: 17 BRPM | DIASTOLIC BLOOD PRESSURE: 74 MMHG | SYSTOLIC BLOOD PRESSURE: 130 MMHG | HEART RATE: 68 BPM | BODY MASS INDEX: 33.84 KG/M2 | TEMPERATURE: 97.2 F | OXYGEN SATURATION: 97 % | HEIGHT: 67 IN | WEIGHT: 215.6 LBS

## 2023-06-28 DIAGNOSIS — Z11.4 SCREENING FOR HIV (HUMAN IMMUNODEFICIENCY VIRUS): ICD-10-CM

## 2023-06-28 DIAGNOSIS — Z13.0 SCREENING FOR ENDOCRINE, METABOLIC AND IMMUNITY DISORDER: ICD-10-CM

## 2023-06-28 DIAGNOSIS — E89.0 POSTOPERATIVE HYPOTHYROIDISM: ICD-10-CM

## 2023-06-28 DIAGNOSIS — Z13.228 SCREENING FOR ENDOCRINE, METABOLIC AND IMMUNITY DISORDER: ICD-10-CM

## 2023-06-28 DIAGNOSIS — Z00.00 ANNUAL PHYSICAL EXAM: Primary | ICD-10-CM

## 2023-06-28 DIAGNOSIS — E66.09 CLASS 1 OBESITY DUE TO EXCESS CALORIES WITH SERIOUS COMORBIDITY AND BODY MASS INDEX (BMI) OF 33.0 TO 33.9 IN ADULT: ICD-10-CM

## 2023-06-28 DIAGNOSIS — Z12.31 ENCOUNTER FOR SCREENING MAMMOGRAM FOR BREAST CANCER: ICD-10-CM

## 2023-06-28 DIAGNOSIS — F17.200 NICOTINE DEPENDENCE, UNCOMPLICATED, UNSPECIFIED NICOTINE PRODUCT TYPE: ICD-10-CM

## 2023-06-28 DIAGNOSIS — Z13.29 SCREENING FOR ENDOCRINE, METABOLIC AND IMMUNITY DISORDER: ICD-10-CM

## 2023-06-28 DIAGNOSIS — Z11.59 ENCOUNTER FOR HEPATITIS C SCREENING TEST FOR LOW RISK PATIENT: ICD-10-CM

## 2023-06-28 DIAGNOSIS — Z12.11 SCREENING FOR COLON CANCER: ICD-10-CM

## 2023-06-28 DIAGNOSIS — I10 ESSENTIAL HYPERTENSION: ICD-10-CM

## 2023-06-28 DIAGNOSIS — Z13.6 SCREENING FOR CARDIOVASCULAR CONDITION: ICD-10-CM

## 2023-06-28 DIAGNOSIS — I10 PRIMARY HYPERTENSION: ICD-10-CM

## 2023-06-28 PROBLEM — E66.811 CLASS 1 OBESITY DUE TO EXCESS CALORIES IN ADULT: Status: ACTIVE | Noted: 2023-06-28

## 2023-06-28 PROCEDURE — 99396 PREV VISIT EST AGE 40-64: CPT | Performed by: FAMILY MEDICINE

## 2023-06-28 RX ORDER — ATENOLOL 50 MG/1
50 TABLET ORAL DAILY
Qty: 90 TABLET | Refills: 1 | Status: SHIPPED | OUTPATIENT
Start: 2023-06-28

## 2023-06-28 RX ORDER — LEVOTHYROXINE SODIUM 0.12 MG/1
125 TABLET ORAL DAILY
Qty: 90 TABLET | Refills: 1 | Status: SHIPPED | OUTPATIENT
Start: 2023-06-28

## 2023-06-28 RX ORDER — HYDROCHLOROTHIAZIDE 25 MG/1
25 TABLET ORAL DAILY
Qty: 90 TABLET | Refills: 1 | Status: SHIPPED | OUTPATIENT
Start: 2023-06-28

## 2023-06-28 NOTE — PROGRESS NOTES
[address] [facility]    NAME: Dwayne Barone  AGE: 59 y o  SEX: female  : 1960     DATE: 2023     Assessment and Plan:     Problem List Items Addressed This Visit        Endocrine    Postoperative hypothyroidism     The patient will continue on the current dose of her levothyroxine  She is due for lab work and will go for the testing as ordered  We will follow-up with the results when available  We will see her back as scheduled  Relevant Medications    atenolol (TENORMIN) 50 mg tablet    levothyroxine 125 mcg tablet    Other Relevant Orders    T4, free    TSH, 3rd generation       Cardiovascular and Mediastinum    Hypertension     The patient's blood pressure is stable on her current medication  I did advise her to work on diet and weight loss  She will limit her salt intake  She is strongly encouraged to quit smoking  We will see her back in the office as scheduled  Relevant Medications    atenolol (TENORMIN) 50 mg tablet    hydrochlorothiazide (HYDRODIURIL) 25 mg tablet    Other Relevant Orders    T4, free    TSH, 3rd generation    Comprehensive metabolic panel    CBC and differential    LDL cholesterol, direct    Lipid panel    UA (URINE) with reflex to Scope       Other    Class 1 obesity due to excess calories in adult     I advised her to start weighing herself daily to track her weight  The patient was advised to start eating 7 non starchy vegetables and 3 fruits every day as well as 10-12 nuts per day  She was also advised to add on psyllium fiber 1 tbsp twice a day for goal of 13 g per day  She was advised to drink 16 oz of water before all meals and to avoid any artificially sweetened drinks  She was also advised to try high intensity interval training for 4 minutes per day along with resistance exercises    I also advised her to keep a food diary to track everything that she is eating in the course of the day  At meals, she should always cut his dish in  half and eat half her meal and then determine if she is still hungry prior to eating the additional half  We will see her back in the office as scheduled  Nicotine dependence     Tobacco Cessation Counseling: Tobacco cessation counseling and education was provided  The patient is sincerely urged to quit consumption of tobacco  She is not ready to quit tobacco  The numerous health risks of tobacco consumption were discussed  If she decides to quit, there are a number of helpful adjunctive aids, and she can see me to discuss nicotine replacement therapy, chantix, or bupropion anytime in the future             Relevant Orders    CT lung screening program   Other Visit Diagnoses     Annual physical exam    -  Primary    Encounter for screening mammogram for breast cancer        Relevant Orders    Mammo screening bilateral w 3d & cad    Screening for HIV (human immunodeficiency virus)        Relevant Orders    HIV 1/2 AG/AB W REFLEX [facility] and [facility] only    Encounter for hepatitis C screening test for low risk patient        Relevant Orders    Hepatitis C Ab W/Refl To HCV RNA, Qn, PCR    Screening for cardiovascular condition        Relevant Orders    T4, free    TSH, 3rd generation    Comprehensive metabolic panel    CBC and differential    LDL cholesterol, direct    Lipid panel    UA (URINE) with reflex to Scope    Screening for endocrine, metabolic and immunity disorder        Relevant Orders    T4, free    TSH, 3rd generation    Comprehensive metabolic panel    CBC and differential    LDL cholesterol, direct    Lipid panel    UA (URINE) with reflex to Scope    Screening for colon cancer        Relevant Orders    Cologuard    Essential hypertension        Relevant Medications    atenolol (TENORMIN) 50 mg tablet    hydrochlorothiazide (HYDRODIURIL) 25 mg tablet      The patient is refusing all immunizations at this time since she is going to be traveling  I strongly encouraged her to schedule a pelvic exam and Pap with gynecology and she is going to consider it at this time  We will see her back in the office as scheduled in 6 months  Immunizations and preventive care screenings were discussed with patient today  Appropriate education was printed on patient's after visit summary  Counseling:  Dental Health: discussed importance of regular tooth brushing, flossing, and dental visits  Injury prevention: discussed safety/seat belts, safety helmets, smoke detectors, carbon dioxide detectors, and smoking near bedding or upholstery  Exercise: the importance of regular exercise/physical activity was discussed  Recommend exercise 3-5 times per week for at least 30 minutes  BMI Counseling: Body mass index is 33 77 kg/m²  The BMI is above normal  Nutrition recommendations include decreasing portion sizes, encouraging healthy choices of fruits and vegetables, decreasing fast food intake, consuming healthier snacks, limiting drinks that contain sugar, moderation in carbohydrate intake, increasing intake of lean protein, reducing intake of saturated and trans fat and reducing intake of cholesterol  Exercise recommendations include exercising 3-5 times per week and strength training exercises  No pharmacotherapy was ordered  Patient referred to PCP  Rationale for BMI follow-up plan is due to patient being overweight or obese  Depression Screening and Follow-up Plan: Patient was screened for depression during today's encounter  They screened negative with a PHQ-2 score of 0  Return in about 6 months (around 12/28/2023) for Recheck  Chief Complaint:     Chief Complaint   Patient presents with   • Annual Exam     No questions or concerns at this time  History of Present Illness:     Adult Annual Physical   Patient here for a comprehensive physical exam  The patient reports no problems  She is taking her regular medications    She is due for blood work  She is still smoking  The patient denies any chest pain, shortness of breath, or palpitations  There is no edema  There are no headaches or visual changes  There is no lightheadedness, dizziness, or fainting spells  She is not checking her BP  The patient currently denies any nausea, vomiting, or GERD symptoms  she has normal bowel movements and normal urine output  she has a normal appetite  She sees her eye doctor regularly  Diet and Physical Activity  Diet/Nutrition: well balanced diet, limited junk food, low carb diet, consuming 3-5 servings of fruits/vegetables daily and limited fruits/vegetables  Exercise: no formal exercise and She is on her feet all day and is active         Depression Screening  PHQ-2/9 Depression Screening    Little interest or pleasure in doing things: 0 - not at all  Feeling down, depressed, or hopeless: 0 - not at all  PHQ-2 Score: 0  PHQ-2 Interpretation: Negative depression screen       General Health  Sleep: sleeps well  Hearing: normal - bilateral   Vision: no vision problems, goes for regular eye exams, most recent eye exam <1 year ago and most recent eye exam >1 year ago  She [facility] the optometrist at [facility]  Dental: no dental visits for >1 year and The patient has dentures,    /GYN Health  Patient is: postmenopausal       Review of Systems:     Review of Systems   Constitutional: Negative  HENT: Negative  Eyes: Negative  Respiratory: Negative  Cardiovascular: Negative  Gastrointestinal: Negative  Endocrine: Negative  Genitourinary: Negative  Musculoskeletal: Negative  Skin: Negative  Allergic/Immunologic: Negative  Neurological: Negative  Hematological: Negative  Psychiatric/Behavioral: Negative         Past Medical History:     Past Medical History:   Diagnosis Date   • H/O thyroidectomy    • HTN (hypertension)    • Hyperlipidemia    • Hypothyroidism, postop       Past Surgical History:     Past Surgical History:   Procedure Laterality Date   • THYROIDECTOMY  09/21/2010   • TUBAL LIGATION     • WISDOM TOOTH EXTRACTION        Social History:     Social History     Socioeconomic History   • Marital status:      Spouse name: None   • Number of children: None   • Years of education: None   • Highest education level: None   Occupational History   • Occupation: full time   Tobacco Use   • Smoking status: Every Day     Packs/day: 1 00     Years: 40 00     Total pack years: 40 00     Types: Cigarettes   • Smokeless tobacco: Never   • Tobacco comments:     smoking a little less than a pack a day  Vaping Use   • Vaping Use: Never used   Substance and Sexual Activity   • Alcohol use: No   • Drug use: No   • Sexual activity: Not Currently   Other Topics Concern   • None   Social History Narrative   • None     Social Determinants of Health     Financial Resource Strain: Low Risk  (6/10/2020)    Overall Financial Resource Strain (CARDIA)    • Difficulty of Paying Living Expenses: Not very hard   Food Insecurity: No Food Insecurity (6/10/2020)    Hunger Vital Sign    • Worried About Running Out of Food in the Last Year: Never true    • Ran Out of Food in the Last Year: Never true   Transportation Needs: No Transportation Needs (6/10/2020)    PRAPARE - Transportation    • Lack of Transportation (Medical): No    • Lack of Transportation (Non-Medical): No   Physical Activity: Inactive (6/10/2020)    Exercise Vital Sign    • Days of Exercise per Week: 0 days    • Minutes of Exercise per Session: 0 min   Stress: Stress Concern Present (6/10/2020)    [address]    • Feeling of Stress :  To some extent   Social Connections: Socially Isolated (6/10/2020)    Social Connection and Isolation Panel [NHANES]    • Frequency of Communication with Friends and Family: More than three times a week    • Frequency of Social Gatherings with Friends and Family: More "than three times a week    • Attends Oriental orthodox Services: Never    • Active Member of Clubs or Organizations: No    • Attends Club or Organization Meetings: Never    • Marital Status:    Intimate Partner Violence: Not At Risk (6/27/2022)    Humiliation, Afraid, Rape, and Kick questionnaire    • Fear of Current or Ex-Partner: No    • Emotionally Abused: No    • Physically Abused: No    • Sexually Abused: No   Housing Stability: Not on file      Family History:     Family History   Problem Relation Age of Onset   • Atrial fibrillation Father    • Hypertension Father    • Stroke Father    • Kidney cancer Brother       Current Medications:     Current Outpatient Medications   Medication Sig Dispense Refill   • atenolol (TENORMIN) 50 mg tablet Take 1 tablet (50 mg total) by mouth daily 90 tablet 1   • hydrochlorothiazide (HYDRODIURIL) 25 mg tablet Take 1 tablet (25 mg total) by mouth daily 90 tablet 1   • levothyroxine 125 mcg tablet Take 1 tablet (125 mcg total) by mouth daily 90 tablet 1     No current facility-administered medications for this visit  Allergies:     No Known Allergies   Physical Exam:     /74   Pulse 68   Temp (!) 97 2 °F (36 2 °C) (Tympanic)   Resp 17   Ht 5' 7\" (1 702 m)   Wt 97 8 kg (215 lb 9 6 oz)   SpO2 97%   BMI 33 77 kg/m²     Physical Exam  Constitutional:       General: She is not in acute distress  Appearance: She is well-developed  She is not diaphoretic  HENT:      Head: Normocephalic and atraumatic  Right Ear: External ear normal       Left Ear: External ear normal       Nose: Nose normal       Mouth/Throat:      Pharynx: No oropharyngeal exudate  Eyes:      General: No scleral icterus  Right eye: No discharge  Left eye: No discharge  Pupils: Pupils are equal, round, and reactive to light  Neck:      Thyroid: No thyromegaly  Vascular: No JVD  Trachea: No tracheal deviation     Cardiovascular:      Rate and Rhythm: Normal " rate and regular rhythm  Heart sounds: Normal heart sounds  No murmur heard  No friction rub  No gallop  Pulmonary:      Effort: Pulmonary effort is normal  No respiratory distress  Breath sounds: Normal breath sounds  No wheezing or rales  Chest:      Chest wall: No tenderness  Abdominal:      General: Bowel sounds are normal  There is no distension  Palpations: Abdomen is soft  There is no mass  Tenderness: There is no abdominal tenderness  There is no guarding or rebound  Hernia: No hernia is present  Musculoskeletal:         General: No tenderness or deformity  Normal range of motion  Cervical back: Normal range of motion and neck supple  Lymphadenopathy:      Cervical: No cervical adenopathy  Skin:     General: Skin is warm and dry  Coloration: Skin is not pale  Findings: No erythema or rash  Neurological:      Mental Status: She is alert and oriented to person, place, and time  Cranial Nerves: No cranial nerve deficit  Sensory: No sensory deficit  Motor: No abnormal muscle tone  Coordination: Coordination normal       Deep Tendon Reflexes: Reflexes normal    Psychiatric:         Behavior: Behavior normal          Thought Content:  Thought content normal           Clif Barroso DO  [address]

## 2023-06-28 NOTE — ASSESSMENT & PLAN NOTE
The patient will continue on the current dose of her levothyroxine  She is due for lab work and will go for the testing as ordered  We will follow-up with the results when available  We will see her back as scheduled

## 2023-06-28 NOTE — ASSESSMENT & PLAN NOTE
The patient's blood pressure is stable on her current medication  I did advise her to work on diet and weight loss  She will limit her salt intake  She is strongly encouraged to quit smoking  We will see her back in the office as scheduled

## 2023-07-26 LAB
ALBUMIN SERPL-MCNC: 4.1 G/DL (ref 3.6–5.1)
ALBUMIN/GLOB SERPL: 1.6 (CALC) (ref 1–2.5)
ALP SERPL-CCNC: 90 U/L (ref 37–153)
ALT SERPL-CCNC: 11 U/L (ref 6–29)
AST SERPL-CCNC: 10 U/L (ref 10–35)
BASOPHILS # BLD AUTO: 50 CELLS/UL (ref 0–200)
BASOPHILS NFR BLD AUTO: 0.8 %
BILIRUB SERPL-MCNC: 0.6 MG/DL (ref 0.2–1.2)
BUN SERPL-MCNC: 16 MG/DL (ref 7–25)
BUN/CREAT SERPL: 36 (CALC) (ref 6–22)
CALCIUM SERPL-MCNC: 9.3 MG/DL (ref 8.6–10.4)
CHLORIDE SERPL-SCNC: 102 MMOL/L (ref 98–110)
CHOLEST SERPL-MCNC: 204 MG/DL
CHOLEST/HDLC SERPL: 4.4 (CALC)
CO2 SERPL-SCNC: 30 MMOL/L (ref 20–32)
CREAT SERPL-MCNC: 0.44 MG/DL (ref 0.5–1.05)
EOSINOPHIL # BLD AUTO: 151 CELLS/UL (ref 15–500)
EOSINOPHIL NFR BLD AUTO: 2.4 %
ERYTHROCYTE [DISTWIDTH] IN BLOOD BY AUTOMATED COUNT: 13.8 % (ref 11–15)
GFR/BSA.PRED SERPLBLD CYS-BASED-ARV: 109 ML/MIN/1.73M2
GLOBULIN SER CALC-MCNC: 2.5 G/DL (CALC) (ref 1.9–3.7)
GLUCOSE SERPL-MCNC: 86 MG/DL (ref 65–99)
HCT VFR BLD AUTO: 44.3 % (ref 35–45)
HCV AB SERPL QL IA: NORMAL
HDLC SERPL-MCNC: 46 MG/DL
HGB BLD-MCNC: 14.8 G/DL (ref 11.7–15.5)
HIV 1+2 AB+HIV1 P24 AG SERPL QL IA: NORMAL
LDLC SERPL CALC-MCNC: 129 MG/DL (CALC)
LDLC SERPL DIRECT ASSAY-MCNC: 135 MG/DL
LYMPHOCYTES # BLD AUTO: 1959 CELLS/UL (ref 850–3900)
LYMPHOCYTES NFR BLD AUTO: 31.1 %
MCH RBC QN AUTO: 28.4 PG (ref 27–33)
MCHC RBC AUTO-ENTMCNC: 33.4 G/DL (ref 32–36)
MCV RBC AUTO: 85 FL (ref 80–100)
MONOCYTES # BLD AUTO: 454 CELLS/UL (ref 200–950)
MONOCYTES NFR BLD AUTO: 7.2 %
NEUTROPHILS # BLD AUTO: 3686 CELLS/UL (ref 1500–7800)
NEUTROPHILS NFR BLD AUTO: 58.5 %
NONHDLC SERPL-MCNC: 158 MG/DL (CALC)
PLATELET # BLD AUTO: 297 THOUSAND/UL (ref 140–400)
PMV BLD REES-ECKER: 9.8 FL (ref 7.5–12.5)
POTASSIUM SERPL-SCNC: 3.7 MMOL/L (ref 3.5–5.3)
PROT SERPL-MCNC: 6.6 G/DL (ref 6.1–8.1)
RBC # BLD AUTO: 5.21 MILLION/UL (ref 3.8–5.1)
SODIUM SERPL-SCNC: 139 MMOL/L (ref 135–146)
T4 FREE SERPL-MCNC: 1.3 NG/DL (ref 0.8–1.8)
TRIGL SERPL-MCNC: 169 MG/DL
TSH SERPL-ACNC: 1.05 MIU/L (ref 0.4–4.5)
WBC # BLD AUTO: 6.3 THOUSAND/UL (ref 3.8–10.8)

## 2023-08-16 ENCOUNTER — TELEPHONE (OUTPATIENT)
Dept: FAMILY MEDICINE CLINIC | Facility: CLINIC | Age: 63
End: 2023-08-16

## 2023-08-16 DIAGNOSIS — E78.5 MILD HYPERLIPIDEMIA: Primary | ICD-10-CM

## 2023-08-16 RX ORDER — ATORVASTATIN CALCIUM 20 MG/1
20 TABLET, FILM COATED ORAL DAILY
Qty: 90 TABLET | Refills: 1 | Status: SHIPPED | OUTPATIENT
Start: 2023-08-16

## 2023-08-16 NOTE — TELEPHONE ENCOUNTER
Joann called back and stated that she received a message and that she is interested in starting to take the Atorvastatin and her pharmacy is [facility].     Thank you

## 2023-10-02 ENCOUNTER — OFFICE VISIT (OUTPATIENT)
Dept: URGENT CARE | Facility: CLINIC | Age: 63
End: 2023-10-02
Payer: COMMERCIAL

## 2023-10-02 VITALS
TEMPERATURE: 98 F | HEART RATE: 58 BPM | DIASTOLIC BLOOD PRESSURE: 80 MMHG | OXYGEN SATURATION: 97 % | RESPIRATION RATE: 16 BRPM | BODY MASS INDEX: 33.77 KG/M2 | SYSTOLIC BLOOD PRESSURE: 122 MMHG | HEIGHT: 67 IN

## 2023-10-02 DIAGNOSIS — S05.01XA ABRASION OF RIGHT CORNEA, INITIAL ENCOUNTER: Primary | ICD-10-CM

## 2023-10-02 PROCEDURE — G0382 LEV 3 HOSP TYPE B ED VISIT: HCPCS | Performed by: PHYSICIAN ASSISTANT

## 2023-10-02 RX ORDER — CIPROFLOXACIN HYDROCHLORIDE 3.5 MG/ML
1 SOLUTION/ DROPS TOPICAL
Qty: 2.1 ML | Refills: 0 | Status: SHIPPED | OUTPATIENT
Start: 2023-10-02 | End: 2023-10-09

## 2023-10-02 NOTE — PATIENT INSTRUCTIONS
Start eye drops to night - 1 drop in right eye every 4 hours  Follow up with [facility] tomorrow at 11 am.   Follow up with PCP in 3-5 days. Proceed to  ER if symptoms worsen.

## 2023-10-02 NOTE — PROGRESS NOTES
[facility]        NAME: Lg Toribio is a 59 y.o. female  : 1960    MRN: [id number]  DATE: 2023  TIME: 5:58 PM    Assessment and Plan   Abrasion of right cornea, initial encounter [S05.01XA]  1. Abrasion of right cornea, initial encounter  ciprofloxacin (CILOXAN) 0.3 % ophthalmic solution            Patient Instructions   I called [facility] and discussed with the tech. They will see Crystal tomorrow at 11 AM.    Start eye drops tonight - 1 drop in right eye every 4 hours  Follow up with [facility] tomorrow at 11 am.   Follow up with PCP in 3-5 days. Proceed to  ER if symptoms worsen. Chief Complaint     Chief Complaint   Patient presents with    Conjunctivitis     Pt reports right eye redness, discharge, with discharge. States onset of symptoms Friday with progression. Managing with warm compress. History of Present Illness       Eye Problem   The right eye is affected. This is a new problem. Episode onset: Friday. The problem has been rapidly worsening. There was no injury mechanism. The pain is moderate. There is No known exposure to pink eye. She Does not wear contacts. Associated symptoms include blurred vision, an eye discharge, eye redness, itching and photophobia. Pertinent negatives include no double vision, fever, foreign body sensation, recent URI or vomiting. Treatments tried: sty eye drops. The treatment provided no relief. Patient reports h/o Graves disease resulting in dry eyes. She notes progressive worsening of symptoms including increased discomfort with eye motions (she states this is always present due to Grave's disease)    She wears glasses, no contacts     Review of Systems   Review of Systems   Constitutional:  Negative for chills and fever. HENT:  Negative for ear pain and sore throat. Eyes:  Positive for blurred vision, photophobia, discharge, redness and itching.  Negative for double vision, pain and visual disturbance. Respiratory:  Negative for cough and shortness of breath. Cardiovascular:  Negative for chest pain and palpitations. Gastrointestinal:  Negative for abdominal pain and vomiting. Genitourinary:  Negative for dysuria and hematuria. Musculoskeletal:  Negative for arthralgias and back pain. Skin:  Negative for color change and rash. Neurological:  Negative for seizures and syncope. All other systems reviewed and are negative. Current Medications       Current Outpatient Medications:     atenolol (TENORMIN) 50 mg tablet, Take 1 tablet (50 mg total) by mouth daily, Disp: 90 tablet, Rfl: 1    atorvastatin (LIPITOR) 20 mg tablet, Take 1 tablet (20 mg total) by mouth daily, Disp: 90 tablet, Rfl: 1    ciprofloxacin (CILOXAN) 0.3 % ophthalmic solution, Administer 1 drop to the right eye 6 (six) times a day for 7 days, Disp: 2.1 mL, Rfl: 0    hydrochlorothiazide (HYDRODIURIL) 25 mg tablet, Take 1 tablet (25 mg total) by mouth daily, Disp: 90 tablet, Rfl: 1    levothyroxine 125 mcg tablet, Take 1 tablet (125 mcg total) by mouth daily, Disp: 90 tablet, Rfl: 1    Current Allergies     Allergies as of 10/02/2023    (No Known Allergies)            The following portions of the patient's history were reviewed and updated as appropriate: allergies, current medications, past family history, past medical history, past social history, past surgical history and problem list.     Past Medical History:   Diagnosis Date    H/O thyroidectomy     HTN (hypertension)     Hyperlipidemia     Hypothyroidism, postop        Past Surgical History:   Procedure Laterality Date    THYROIDECTOMY  09/21/2010    TUBAL LIGATION      WISDOM TOOTH EXTRACTION         Family History   Problem Relation Age of Onset    Atrial fibrillation Father     Hypertension Father     Stroke Father     Kidney cancer Brother          Medications have been verified.         Objective   /80 (BP Location: Right arm, Patient Position: Sitting)   Pulse 58   Temp 98 °F (36.7 °C)   Resp 16   Ht 5' 7" (1.702 m)   SpO2 97%   BMI 33.77 kg/m²   No LMP recorded. Patient is postmenopausal.       Physical Exam     Physical Exam  Vitals and nursing note reviewed. Constitutional:       General: She is not in acute distress. Appearance: Normal appearance. HENT:      Head: Normocephalic and atraumatic. Eyes:      Extraocular Movements: Extraocular movements intact. Pupils: Pupils are equal, round, and reactive to light. Comments: Visual acuity corrected:  OD: 20/200  OS: 20/25  OU: 20/25    Exophthalmos bilat. Swelling of the right upper and lower eye lid. Significant conjunctival injection diffusely throughout the right eye. No foreign body visualized, no stye or hordeolum visualized    The eye was anesthetized with tetracaine and fluorescein drops applied. Visualization under black light revealed an area of increased uptake on the cornea. Cardiovascular:      Rate and Rhythm: Normal rate and regular rhythm. Pulses: Normal pulses. Heart sounds: Normal heart sounds. Pulmonary:      Effort: Pulmonary effort is normal.      Breath sounds: Normal breath sounds. Skin:     General: Skin is warm and dry. Neurological:      Mental Status: She is alert and oriented to person, place, and time.    Psychiatric:         Mood and Affect: Mood normal.         Behavior: Behavior normal.

## 2024-01-15 DIAGNOSIS — E89.0 POSTOPERATIVE HYPOTHYROIDISM: ICD-10-CM

## 2024-01-15 RX ORDER — LEVOTHYROXINE SODIUM 0.12 MG/1
125 TABLET ORAL DAILY
Qty: 90 TABLET | Refills: 0 | Status: SHIPPED | OUTPATIENT
Start: 2024-01-15

## 2024-01-16 DIAGNOSIS — I10 PRIMARY HYPERTENSION: ICD-10-CM

## 2024-01-16 RX ORDER — ATENOLOL 50 MG/1
50 TABLET ORAL DAILY
Qty: 30 TABLET | Refills: 0 | Status: SHIPPED | OUTPATIENT
Start: 2024-01-16

## 2024-01-23 DIAGNOSIS — I10 ESSENTIAL HYPERTENSION: ICD-10-CM

## 2024-01-23 RX ORDER — HYDROCHLOROTHIAZIDE 25 MG/1
25 TABLET ORAL DAILY
Qty: 30 TABLET | Refills: 0 | Status: SHIPPED | OUTPATIENT
Start: 2024-01-23

## 2024-01-24 DIAGNOSIS — I10 PRIMARY HYPERTENSION: ICD-10-CM

## 2024-01-24 DIAGNOSIS — I10 ESSENTIAL HYPERTENSION: ICD-10-CM

## 2024-01-24 DIAGNOSIS — E89.0 POSTOPERATIVE HYPOTHYROIDISM: ICD-10-CM

## 2024-01-24 RX ORDER — LEVOTHYROXINE SODIUM 0.12 MG/1
125 TABLET ORAL DAILY
Qty: 90 TABLET | Refills: 0 | OUTPATIENT
Start: 2024-01-24

## 2024-01-24 RX ORDER — HYDROCHLOROTHIAZIDE 25 MG/1
25 TABLET ORAL DAILY
Qty: 30 TABLET | Refills: 0 | OUTPATIENT
Start: 2024-01-24

## 2024-01-24 RX ORDER — ATENOLOL 50 MG/1
50 TABLET ORAL DAILY
Qty: 30 TABLET | Refills: 0 | OUTPATIENT
Start: 2024-01-24

## 2024-02-12 DIAGNOSIS — I10 PRIMARY HYPERTENSION: ICD-10-CM

## 2024-02-12 RX ORDER — ATENOLOL 50 MG/1
50 TABLET ORAL DAILY
Qty: 30 TABLET | Refills: 5 | Status: SHIPPED | OUTPATIENT
Start: 2024-02-12

## 2024-02-17 DIAGNOSIS — I10 ESSENTIAL HYPERTENSION: ICD-10-CM

## 2024-02-19 RX ORDER — HYDROCHLOROTHIAZIDE 25 MG/1
25 TABLET ORAL DAILY
Qty: 30 TABLET | Refills: 3 | Status: SHIPPED | OUTPATIENT
Start: 2024-02-19

## 2024-04-08 DIAGNOSIS — E78.5 MILD HYPERLIPIDEMIA: ICD-10-CM

## 2024-04-08 RX ORDER — ATORVASTATIN CALCIUM 20 MG/1
20 TABLET, FILM COATED ORAL DAILY
Qty: 30 TABLET | Refills: 0 | Status: SHIPPED | OUTPATIENT
Start: 2024-04-08

## 2024-04-19 DIAGNOSIS — E89.0 POSTOPERATIVE HYPOTHYROIDISM: ICD-10-CM

## 2024-04-19 RX ORDER — LEVOTHYROXINE SODIUM 0.12 MG/1
125 TABLET ORAL DAILY
Qty: 90 TABLET | Refills: 1 | Status: SHIPPED | OUTPATIENT
Start: 2024-04-19

## 2024-05-03 DIAGNOSIS — E78.5 MILD HYPERLIPIDEMIA: ICD-10-CM

## 2024-05-05 RX ORDER — ATORVASTATIN CALCIUM 20 MG/1
20 TABLET, FILM COATED ORAL DAILY
Qty: 30 TABLET | Refills: 5 | Status: SHIPPED | OUTPATIENT
Start: 2024-05-05

## 2024-06-23 DIAGNOSIS — I10 ESSENTIAL HYPERTENSION: ICD-10-CM

## 2024-06-23 RX ORDER — HYDROCHLOROTHIAZIDE 25 MG/1
25 TABLET ORAL DAILY
Qty: 30 TABLET | Refills: 5 | Status: SHIPPED | OUTPATIENT
Start: 2024-06-23

## 2024-07-28 DIAGNOSIS — I10 PRIMARY HYPERTENSION: ICD-10-CM

## 2024-07-29 ENCOUNTER — TELEPHONE (OUTPATIENT)
Dept: FAMILY MEDICINE CLINIC | Facility: CLINIC | Age: 64
End: 2024-07-29

## 2024-07-29 RX ORDER — ATENOLOL 50 MG/1
50 TABLET ORAL DAILY
Qty: 30 TABLET | Refills: 0 | Status: SHIPPED | OUTPATIENT
Start: 2024-07-29

## 2024-07-29 NOTE — TELEPHONE ENCOUNTER
Pt called to schedule an appt.  We scheduled a physical with Latia for 10/29 because that is when her insurance with her new job will start.  Pt would like her prescriptions refilled as per pharmacy requests.     Thank you

## 2024-08-24 DIAGNOSIS — I10 PRIMARY HYPERTENSION: ICD-10-CM

## 2024-08-26 RX ORDER — ATENOLOL 50 MG/1
50 TABLET ORAL DAILY
Qty: 90 TABLET | Refills: 1 | Status: SHIPPED | OUTPATIENT
Start: 2024-08-26

## 2024-10-17 DIAGNOSIS — E89.0 POSTOPERATIVE HYPOTHYROIDISM: ICD-10-CM

## 2024-10-17 RX ORDER — LEVOTHYROXINE SODIUM 125 UG/1
125 TABLET ORAL DAILY
Qty: 30 TABLET | Refills: 0 | Status: SHIPPED | OUTPATIENT
Start: 2024-10-17

## 2024-10-26 DIAGNOSIS — E78.5 MILD HYPERLIPIDEMIA: ICD-10-CM

## 2024-10-29 RX ORDER — ATORVASTATIN CALCIUM 20 MG/1
20 TABLET, FILM COATED ORAL DAILY
Qty: 30 TABLET | Refills: 2 | Status: SHIPPED | OUTPATIENT
Start: 2024-10-29

## 2024-10-29 NOTE — TELEPHONE ENCOUNTER
Patient cancelled her appt for today and rescheduled for January. Can we please move up her appt. She has not been seen in over a year and is also well overdue for lab work. Thank you

## 2024-11-15 DIAGNOSIS — E89.0 POSTOPERATIVE HYPOTHYROIDISM: ICD-10-CM

## 2024-11-15 RX ORDER — LEVOTHYROXINE SODIUM 125 UG/1
125 TABLET ORAL DAILY
Qty: 90 TABLET | Refills: 0 | Status: SHIPPED | OUTPATIENT
Start: 2024-11-15

## 2024-12-07 DIAGNOSIS — I10 ESSENTIAL HYPERTENSION: ICD-10-CM

## 2024-12-09 RX ORDER — HYDROCHLOROTHIAZIDE 25 MG/1
25 TABLET ORAL DAILY
Qty: 30 TABLET | Refills: 0 | Status: SHIPPED | OUTPATIENT
Start: 2024-12-09

## 2025-01-06 DIAGNOSIS — I10 ESSENTIAL HYPERTENSION: ICD-10-CM

## 2025-01-07 RX ORDER — HYDROCHLOROTHIAZIDE 25 MG/1
25 TABLET ORAL DAILY
Qty: 90 TABLET | Refills: 0 | Status: SHIPPED | OUTPATIENT
Start: 2025-01-07

## 2025-01-18 DIAGNOSIS — E78.5 MILD HYPERLIPIDEMIA: ICD-10-CM

## 2025-01-20 RX ORDER — ATORVASTATIN CALCIUM 20 MG/1
20 TABLET, FILM COATED ORAL DAILY
Qty: 30 TABLET | Refills: 2 | Status: SHIPPED | OUTPATIENT
Start: 2025-01-20

## 2025-02-12 DIAGNOSIS — E89.0 POSTOPERATIVE HYPOTHYROIDISM: ICD-10-CM

## 2025-02-12 RX ORDER — LEVOTHYROXINE SODIUM 125 UG/1
125 TABLET ORAL DAILY
Qty: 90 TABLET | Refills: 0 | Status: SHIPPED | OUTPATIENT
Start: 2025-02-12

## 2025-02-16 DIAGNOSIS — I10 PRIMARY HYPERTENSION: ICD-10-CM

## 2025-02-17 RX ORDER — ATENOLOL 50 MG/1
50 TABLET ORAL DAILY
Qty: 30 TABLET | Refills: 0 | Status: SHIPPED | OUTPATIENT
Start: 2025-02-17

## 2025-03-04 ENCOUNTER — APPOINTMENT (EMERGENCY)
Dept: CT IMAGING | Facility: HOSPITAL | Age: 65
DRG: 871 | End: 2025-03-04
Payer: COMMERCIAL

## 2025-03-04 ENCOUNTER — APPOINTMENT (EMERGENCY)
Dept: RADIOLOGY | Facility: HOSPITAL | Age: 65
DRG: 871 | End: 2025-03-04
Payer: COMMERCIAL

## 2025-03-04 ENCOUNTER — OFFICE VISIT (OUTPATIENT)
Dept: URGENT CARE | Facility: CLINIC | Age: 65
End: 2025-03-04
Payer: COMMERCIAL

## 2025-03-04 ENCOUNTER — HOSPITAL ENCOUNTER (INPATIENT)
Facility: HOSPITAL | Age: 65
LOS: 6 days | Discharge: HOME | DRG: 871 | End: 2025-03-10
Attending: EMERGENCY MEDICINE | Admitting: STUDENT IN AN ORGANIZED HEALTH CARE EDUCATION/TRAINING PROGRAM
Payer: COMMERCIAL

## 2025-03-04 VITALS
HEART RATE: 83 BPM | TEMPERATURE: 100.4 F | HEIGHT: 66 IN | RESPIRATION RATE: 20 BRPM | DIASTOLIC BLOOD PRESSURE: 80 MMHG | SYSTOLIC BLOOD PRESSURE: 138 MMHG | WEIGHT: 212 LBS | BODY MASS INDEX: 34.07 KG/M2 | OXYGEN SATURATION: 88 %

## 2025-03-04 DIAGNOSIS — R09.02 HYPOXIA: ICD-10-CM

## 2025-03-04 DIAGNOSIS — J10.1 INFLUENZA A: Primary | ICD-10-CM

## 2025-03-04 DIAGNOSIS — I10 HYPERTENSION: ICD-10-CM

## 2025-03-04 DIAGNOSIS — R50.9 FEVER, UNSPECIFIED FEVER CAUSE: ICD-10-CM

## 2025-03-04 DIAGNOSIS — R78.81 POSITIVE BLOOD CULTURE: ICD-10-CM

## 2025-03-04 DIAGNOSIS — R06.02 SHORTNESS OF BREATH: Primary | ICD-10-CM

## 2025-03-04 PROBLEM — E87.6 HYPOKALEMIA: Status: ACTIVE | Noted: 2025-03-04

## 2025-03-04 PROBLEM — A41.9 SEPSIS (HCC): Status: ACTIVE | Noted: 2025-03-04

## 2025-03-04 LAB
2HR DELTA HS TROPONIN: 1 NG/L
ALBUMIN SERPL BCG-MCNC: 4.4 G/DL (ref 3.5–5)
ALP SERPL-CCNC: 71 U/L (ref 34–104)
ALT SERPL W P-5'-P-CCNC: 27 U/L (ref 7–52)
ANION GAP SERPL CALCULATED.3IONS-SCNC: 10 MMOL/L (ref 4–13)
APTT PPP: 34 SECONDS (ref 23–34)
AST SERPL W P-5'-P-CCNC: 48 U/L (ref 13–39)
BASOPHILS # BLD MANUAL: 0 THOUSAND/UL (ref 0–0.1)
BASOPHILS NFR MAR MANUAL: 0 % (ref 0–1)
BILIRUB SERPL-MCNC: 1.01 MG/DL (ref 0.2–1)
BUN SERPL-MCNC: 32 MG/DL (ref 5–25)
CALCIUM SERPL-MCNC: 9.6 MG/DL (ref 8.4–10.2)
CARDIAC TROPONIN I PNL SERPL HS: 35 NG/L (ref ?–50)
CARDIAC TROPONIN I PNL SERPL HS: 36 NG/L (ref ?–50)
CHLORIDE SERPL-SCNC: 98 MMOL/L (ref 96–108)
CO2 SERPL-SCNC: 29 MMOL/L (ref 21–32)
CREAT SERPL-MCNC: 0.67 MG/DL (ref 0.6–1.3)
EOSINOPHIL # BLD MANUAL: 0 THOUSAND/UL (ref 0–0.4)
EOSINOPHIL NFR BLD MANUAL: 0 % (ref 0–6)
ERYTHROCYTE [DISTWIDTH] IN BLOOD BY AUTOMATED COUNT: 15 % (ref 11.6–15.1)
FLUAV RNA RESP QL NAA+PROBE: POSITIVE
FLUBV RNA RESP QL NAA+PROBE: NEGATIVE
GFR SERPL CREATININE-BSD FRML MDRD: 93 ML/MIN/1.73SQ M
GLUCOSE SERPL-MCNC: 125 MG/DL (ref 65–140)
HCT VFR BLD AUTO: 47.1 % (ref 34.8–46.1)
HGB BLD-MCNC: 15.4 G/DL (ref 11.5–15.4)
INR PPP: 1.06 (ref 0.85–1.19)
LACTATE SERPL-SCNC: 1.4 MMOL/L (ref 0.5–2)
LYMPHOCYTES # BLD AUTO: 1.77 THOUSAND/UL (ref 0.6–4.47)
LYMPHOCYTES # BLD AUTO: 11 % (ref 14–44)
MCH RBC QN AUTO: 28.9 PG (ref 26.8–34.3)
MCHC RBC AUTO-ENTMCNC: 32.7 G/DL (ref 31.4–37.4)
MCV RBC AUTO: 89 FL (ref 82–98)
MONOCYTES # BLD AUTO: 2.57 THOUSAND/UL (ref 0–1.22)
MONOCYTES NFR BLD: 16 % (ref 4–12)
NEUTROPHILS # BLD MANUAL: 11.72 THOUSAND/UL (ref 1.85–7.62)
NEUTS BAND NFR BLD MANUAL: 4 % (ref 0–8)
NEUTS SEG NFR BLD AUTO: 69 % (ref 43–75)
PLATELET # BLD AUTO: 247 THOUSANDS/UL (ref 149–390)
PLATELET BLD QL SMEAR: ADEQUATE
PMV BLD AUTO: 9.4 FL (ref 8.9–12.7)
POTASSIUM SERPL-SCNC: 3.2 MMOL/L (ref 3.5–5.3)
PROCALCITONIN SERPL-MCNC: 0.49 NG/ML
PROT SERPL-MCNC: 7.7 G/DL (ref 6.4–8.4)
PROTHROMBIN TIME: 14.3 SECONDS (ref 12.3–15)
RBC # BLD AUTO: 5.32 MILLION/UL (ref 3.81–5.12)
RBC MORPH BLD: NORMAL
RSV RNA RESP QL NAA+PROBE: NEGATIVE
SARS-COV-2 RNA RESP QL NAA+PROBE: NEGATIVE
SODIUM SERPL-SCNC: 137 MMOL/L (ref 135–147)
WBC # BLD AUTO: 16.05 THOUSAND/UL (ref 4.31–10.16)

## 2025-03-04 PROCEDURE — 96365 THER/PROPH/DIAG IV INF INIT: CPT

## 2025-03-04 PROCEDURE — 93005 ELECTROCARDIOGRAM TRACING: CPT

## 2025-03-04 PROCEDURE — 71250 CT THORAX DX C-: CPT

## 2025-03-04 PROCEDURE — 85730 THROMBOPLASTIN TIME PARTIAL: CPT | Performed by: PHYSICIAN ASSISTANT

## 2025-03-04 PROCEDURE — 87077 CULTURE AEROBIC IDENTIFY: CPT | Performed by: PHYSICIAN ASSISTANT

## 2025-03-04 PROCEDURE — 84484 ASSAY OF TROPONIN QUANT: CPT

## 2025-03-04 PROCEDURE — 83605 ASSAY OF LACTIC ACID: CPT | Performed by: PHYSICIAN ASSISTANT

## 2025-03-04 PROCEDURE — 85610 PROTHROMBIN TIME: CPT | Performed by: PHYSICIAN ASSISTANT

## 2025-03-04 PROCEDURE — 36415 COLL VENOUS BLD VENIPUNCTURE: CPT | Performed by: PHYSICIAN ASSISTANT

## 2025-03-04 PROCEDURE — G0383 LEV 4 HOSP TYPE B ED VISIT: HCPCS | Performed by: PHYSICIAN ASSISTANT

## 2025-03-04 PROCEDURE — 99285 EMERGENCY DEPT VISIT HI MDM: CPT

## 2025-03-04 PROCEDURE — 99285 EMERGENCY DEPT VISIT HI MDM: CPT | Performed by: PHYSICIAN ASSISTANT

## 2025-03-04 PROCEDURE — 85007 BL SMEAR W/DIFF WBC COUNT: CPT

## 2025-03-04 PROCEDURE — 80053 COMPREHEN METABOLIC PANEL: CPT

## 2025-03-04 PROCEDURE — 0241U HB NFCT DS VIR RESP RNA 4 TRGT: CPT

## 2025-03-04 PROCEDURE — 99222 1ST HOSP IP/OBS MODERATE 55: CPT | Performed by: INTERNAL MEDICINE

## 2025-03-04 PROCEDURE — 84145 PROCALCITONIN (PCT): CPT | Performed by: PHYSICIAN ASSISTANT

## 2025-03-04 PROCEDURE — 87154 CUL TYP ID BLD PTHGN 6+ TRGT: CPT | Performed by: PHYSICIAN ASSISTANT

## 2025-03-04 PROCEDURE — 85027 COMPLETE CBC AUTOMATED: CPT

## 2025-03-04 PROCEDURE — 87040 BLOOD CULTURE FOR BACTERIA: CPT | Performed by: PHYSICIAN ASSISTANT

## 2025-03-04 PROCEDURE — 71046 X-RAY EXAM CHEST 2 VIEWS: CPT

## 2025-03-04 PROCEDURE — 96367 TX/PROPH/DG ADDL SEQ IV INF: CPT

## 2025-03-04 RX ORDER — ATORVASTATIN CALCIUM 20 MG/1
20 TABLET, FILM COATED ORAL
Status: DISCONTINUED | OUTPATIENT
Start: 2025-03-05 | End: 2025-03-10 | Stop reason: HOSPADM

## 2025-03-04 RX ORDER — HEPARIN SODIUM 5000 [USP'U]/ML
5000 INJECTION, SOLUTION INTRAVENOUS; SUBCUTANEOUS EVERY 8 HOURS SCHEDULED
Status: DISCONTINUED | OUTPATIENT
Start: 2025-03-04 | End: 2025-03-10 | Stop reason: HOSPADM

## 2025-03-04 RX ORDER — ONDANSETRON 2 MG/ML
4 INJECTION INTRAMUSCULAR; INTRAVENOUS EVERY 6 HOURS PRN
Status: DISCONTINUED | OUTPATIENT
Start: 2025-03-04 | End: 2025-03-10 | Stop reason: HOSPADM

## 2025-03-04 RX ORDER — IPRATROPIUM BROMIDE AND ALBUTEROL SULFATE .5; 3 MG/3ML; MG/3ML
1 SOLUTION RESPIRATORY (INHALATION) ONCE
Status: COMPLETED | OUTPATIENT
Start: 2025-03-04 | End: 2025-03-04

## 2025-03-04 RX ORDER — CEFTRIAXONE 1 G/50ML
1000 INJECTION, SOLUTION INTRAVENOUS EVERY 24 HOURS
Status: DISCONTINUED | OUTPATIENT
Start: 2025-03-05 | End: 2025-03-07

## 2025-03-04 RX ORDER — ATENOLOL 50 MG/1
50 TABLET ORAL DAILY
Status: DISCONTINUED | OUTPATIENT
Start: 2025-03-05 | End: 2025-03-09

## 2025-03-04 RX ORDER — HYDROCHLOROTHIAZIDE 25 MG/1
25 TABLET ORAL DAILY
Status: DISCONTINUED | OUTPATIENT
Start: 2025-03-05 | End: 2025-03-10 | Stop reason: HOSPADM

## 2025-03-04 RX ORDER — LEVALBUTEROL INHALATION SOLUTION 1.25 MG/3ML
1.25 SOLUTION RESPIRATORY (INHALATION)
Status: DISCONTINUED | OUTPATIENT
Start: 2025-03-05 | End: 2025-03-10 | Stop reason: HOSPADM

## 2025-03-04 RX ORDER — BENZONATATE 100 MG/1
100 CAPSULE ORAL 3 TIMES DAILY PRN
Status: DISCONTINUED | OUTPATIENT
Start: 2025-03-04 | End: 2025-03-07

## 2025-03-04 RX ORDER — OSELTAMIVIR PHOSPHATE 75 MG/1
75 CAPSULE ORAL ONCE
Status: COMPLETED | OUTPATIENT
Start: 2025-03-04 | End: 2025-03-04

## 2025-03-04 RX ORDER — ACETAMINOPHEN 325 MG/1
650 TABLET ORAL EVERY 6 HOURS PRN
Status: DISCONTINUED | OUTPATIENT
Start: 2025-03-04 | End: 2025-03-10 | Stop reason: HOSPADM

## 2025-03-04 RX ORDER — ALBUTEROL SULFATE 90 UG/1
2 INHALANT RESPIRATORY (INHALATION) EVERY 4 HOURS PRN
Status: DISCONTINUED | OUTPATIENT
Start: 2025-03-04 | End: 2025-03-10 | Stop reason: HOSPADM

## 2025-03-04 RX ORDER — ACETAMINOPHEN 10 MG/ML
1000 INJECTION, SOLUTION INTRAVENOUS ONCE
Status: COMPLETED | OUTPATIENT
Start: 2025-03-04 | End: 2025-03-04

## 2025-03-04 RX ORDER — CEFTRIAXONE 2 G/50ML
2000 INJECTION, SOLUTION INTRAVENOUS ONCE
Status: COMPLETED | OUTPATIENT
Start: 2025-03-04 | End: 2025-03-04

## 2025-03-04 RX ORDER — ALBUTEROL SULFATE 0.83 MG/ML
2.5 SOLUTION RESPIRATORY (INHALATION) EVERY 6 HOURS PRN
Status: DISCONTINUED | OUTPATIENT
Start: 2025-03-04 | End: 2025-03-10 | Stop reason: HOSPADM

## 2025-03-04 RX ORDER — OSELTAMIVIR PHOSPHATE 75 MG/1
75 CAPSULE ORAL EVERY 12 HOURS SCHEDULED
Status: COMPLETED | OUTPATIENT
Start: 2025-03-05 | End: 2025-03-09

## 2025-03-04 RX ORDER — CEFTRIAXONE 2 G/50ML
2000 INJECTION, SOLUTION INTRAVENOUS ONCE
Status: DISCONTINUED | OUTPATIENT
Start: 2025-03-04 | End: 2025-03-04

## 2025-03-04 RX ADMIN — ACETAMINOPHEN 1000 MG: 10 INJECTION INTRAVENOUS at 17:29

## 2025-03-04 RX ADMIN — CEFTRIAXONE 2000 MG: 2 INJECTION, SOLUTION INTRAVENOUS at 18:31

## 2025-03-04 RX ADMIN — OSELTAMIVIR PHOSPHATE 75 MG: 75 CAPSULE ORAL at 19:24

## 2025-03-04 RX ADMIN — SODIUM CHLORIDE 500 ML: 0.9 INJECTION, SOLUTION INTRAVENOUS at 17:29

## 2025-03-04 RX ADMIN — AZITHROMYCIN MONOHYDRATE 500 MG: 500 INJECTION, POWDER, LYOPHILIZED, FOR SOLUTION INTRAVENOUS at 19:24

## 2025-03-04 RX ADMIN — HEPARIN SODIUM 5000 UNITS: 5000 INJECTION INTRAVENOUS; SUBCUTANEOUS at 21:54

## 2025-03-04 NOTE — ED PROVIDER NOTES
Time reflects when diagnosis was documented in both MDM as applicable and the Disposition within this note       Time User Action Codes Description Comment    3/4/2025  7:16 PM Dominic Archer Add [J10.1] Influenza A     3/4/2025  7:16 PM Dominic Archer Add [R09.02] Hypoxia           ED Disposition       ED Disposition   Admit    Condition   Stable    Date/Time   Tue Mar 4, 2025  7:16 PM    Comment   Case was discussed with [facility] and the patient's admission status was agreed to be Admission Status: inpatient status to the service of Dr. Austin.               Assessment & Plan       Medical Decision Making  2 days of cough, congestion, shortness of breath, generalized weakness.  On arrival she is ill-appearing, hypoxic to the high 80s on room air at rest.  She has rhonchi and rales to her right middle and lower lobes on exam.  She appears dry overall.  Febrile on arrival to 100.6.  Lab workup reveals significant leukocytosis, hypokalemia at 3.2.  Procalcitonin elevated.  Influenza A testing positive.  On chest x-ray, questionable infiltrate to right middle/lower lung prompting CT scan.  CT shows bronchitis and possible secondary bacterial process.  Given her lab findings will cover with IV antibiotics.  No MRSA history noted.  Plan to admit patient to the hospital for further antibiotics, Tamiflu, supportive care.    Amount and/or Complexity of Data Reviewed  Labs: ordered.  Radiology: ordered.    Risk  Prescription drug management.  Decision regarding hospitalization.             Medications   azithromycin (ZITHROMAX) 500 mg in sodium chloride 0.9% 250mL IVPB 500 mg (500 mg Intravenous New Bag 3/4/25 1924)   ipratropium-albuterol (FOR EMS ONLY) (DUO-NEB) 0.5-2.5 mg/3 mL inhalation solution 3 mL (0 mL Does not apply Given to EMS 3/4/25 1716)   sodium chloride 0.9 % bolus 500 mL (0 mL Intravenous Stopped 3/4/25 1829)   acetaminophen (Ofirmev) injection 1,000 mg (0 mg Intravenous Stopped 3/4/25 1826)    cefTRIAXone (ROCEPHIN) IVPB (premix in dextrose) 2,000 mg 50 mL (2,000 mg Intravenous New Bag 3/4/25 1831)   oseltamivir (TAMIFLU) capsule 75 mg (75 mg Oral Given 3/4/25 1924)       ED Risk Strat Scores                            SBIRT 20yo+      Flowsheet Row Most Recent Value   Initial Alcohol Screen: US AUDIT-C     1. How often do you have a drink containing alcohol? 0 Filed at: 03/04/2025 1707   2. How many drinks containing alcohol do you have on a typical day you are drinking?  0 Filed at: 03/04/2025 1707   3a. Male UNDER 65: How often do you have five or more drinks on one occasion? 0 Filed at: 03/04/2025 1707   3b. FEMALE Any Age, or MALE 65+: How often do you have 4 or more drinks on one occassion? 0 Filed at: 03/04/2025 1707   Audit-C Score 0 Filed at: 03/04/2025 1707   LATA: How many times in the past year have you...    Used an illegal drug or used a prescription medication for non-medical reasons? Never Filed at: 03/04/2025 1707                            History of Present Illness       Chief Complaint   Patient presents with    Shortness of Breath     Pt to er via ems from  with reports of sob and a cough since Sunday. Oxygen at  in the high 80's. Duoneb en route with no relief.        Past Medical History:   Diagnosis Date    H/O thyroidectomy     HTN (hypertension)     Hyperlipidemia     Hypothyroidism, postop       Past Surgical History:   Procedure Laterality Date    THYROIDECTOMY  09/21/2010    TUBAL LIGATION      WISDOM TOOTH EXTRACTION        Family History   Problem Relation Age of Onset    Atrial fibrillation Father     Hypertension Father     Stroke Father     Kidney cancer Brother       Social History     Tobacco Use    Smoking status: Every Day     Current packs/day: 1.00     Average packs/day: 1 pack/day for 40.0 years (40.0 ttl pk-yrs)     Types: Cigarettes    Smokeless tobacco: Never    Tobacco comments:     smoking a little less than a pack a day.     Vaping Use    Vaping status:  Never Used   Substance Use Topics    Alcohol use: No    Drug use: No      E-Cigarette/Vaping    E-Cigarette Use Never User       E-Cigarette/Vaping Substances    Nicotine No     THC No     CBD No     Flavoring No     Other No     Unknown No       I have reviewed and agree with the history as documented.     Joann is a 64-year-old female presenting with 2 days of cough, congestion, shortness of breath, chest tightness with coughing, fevers.  She notes sick contacts at home with similar.  Her cough was initially dry but has become increasingly productive.  No vomiting or diarrhea noted.      History provided by:  Patient      Review of Systems   Constitutional:  Positive for chills and fever.   HENT:  Positive for congestion. Negative for ear pain, rhinorrhea and sore throat.    Eyes:  Negative for pain and visual disturbance.   Respiratory:  Positive for cough, chest tightness and shortness of breath. Negative for wheezing.    Cardiovascular:  Negative for palpitations and leg swelling.   Gastrointestinal:  Negative for abdominal pain, nausea and vomiting.   Genitourinary:  Negative for dysuria, frequency and urgency.   Musculoskeletal:  Positive for myalgias. Negative for back pain, neck pain and neck stiffness.   Skin:  Negative for rash and wound.   Neurological:  Negative for dizziness, weakness, light-headedness and numbness.           Objective       ED Triage Vitals   Temperature Pulse Blood Pressure Respirations SpO2 Patient Position - Orthostatic VS   03/04/25 1708 03/04/25 1705 03/04/25 1706 03/04/25 1705 03/04/25 1705 03/04/25 1706   (!) 100.6 °F (38.1 °C) 82 146/65 20 (!) 88 % Lying      Temp Source Heart Rate Source BP Location FiO2 (%) Pain Score    03/04/25 1708 03/04/25 1705 03/04/25 1706 -- --    Oral Monitor Right arm        Vitals      Date and Time Temp Pulse SpO2 Resp BP Pain Score FACES Pain Rating User   03/04/25 1915 -- -- 96 % -- -- -- -- MG   03/04/25 1900 -- 56 96 % 20 126/59 -- -- EW    03/04/25 1835 -- --  88 % -- -- -- -- EW   03/04/25 1708 100.6 °F (38.1 °C) -- 92 % -- -- -- -- HR   03/04/25 1706 -- -- -- -- 146/65 -- -- HR   03/04/25 1705 -- 82 88 % 20 -- -- -- HR            Physical Exam  Constitutional:       General: She is not in acute distress.     Appearance: She is well-developed. She is ill-appearing. She is not diaphoretic.   HENT:      Head: Normocephalic and atraumatic.      Right Ear: External ear normal.      Left Ear: External ear normal.      Mouth/Throat:      Mouth: Mucous membranes are dry.   Eyes:      Extraocular Movements:      Right eye: Normal extraocular motion.      Left eye: Normal extraocular motion.      Conjunctiva/sclera: Conjunctivae normal.      Pupils: Pupils are equal, round, and reactive to light.   Cardiovascular:      Rate and Rhythm: Normal rate and regular rhythm.      Comments: No lower extremity edema.  Pulmonary:      Effort: Pulmonary effort is normal. No accessory muscle usage or respiratory distress.      Breath sounds: Rhonchi and rales present. No wheezing.      Comments: Scattered rales, rhonchi to right middle and lower lobes.  Abdominal:      General: Abdomen is flat. There is no distension.   Musculoskeletal:      Cervical back: Normal range of motion. No rigidity.   Skin:     General: Skin is warm and dry.      Capillary Refill: Capillary refill takes less than 2 seconds.      Findings: No erythema or rash.   Neurological:      Mental Status: She is alert and oriented to person, place, and time.      Motor: No abnormal muscle tone.      Coordination: Coordination normal.   Psychiatric:         Behavior: Behavior normal.         Thought Content: Thought content normal.         Judgment: Judgment normal.         Results Reviewed       Procedure Component Value Units Date/Time    HS Troponin I 4hr [[id number]]     Lab Status: No result Specimen: Blood     RBC Morphology Reflex Test [[id number]] Collected: 03/04/25 1708    Lab Status: Final result  Specimen: Blood from Arm, Left Updated: 03/04/25 1901    CBC and differential [[id number]]  (Abnormal) Collected: 03/04/25 1708    Lab Status: Final result Specimen: Blood from Arm, Left Updated: 03/04/25 1814     WBC 16.05 Thousand/uL      RBC 5.32 Million/uL      Hemoglobin 15.4 g/dL      Hematocrit 47.1 %      MCV 89 fL      MCH 28.9 pg      MCHC 32.7 g/dL      RDW 15.0 %      MPV 9.4 fL      Platelets 247 Thousands/uL     Narrative:      This is an appended report.  These results have been appended to a previously verified report.    Manual Differential(PHLEBS Do Not Order) [[id number]]  (Abnormal) Collected: 03/04/25 1708    Lab Status: Final result Specimen: Blood from Arm, Left Updated: 03/04/25 1814     Segmented % 69 %      Bands % 4 %      Lymphocytes % 11 %      Monocytes % 16 %      Eosinophils % 0 %      Basophils % 0 %      Absolute Neutrophils 11.72 Thousand/uL      Absolute Lymphocytes 1.77 Thousand/uL      Absolute Monocytes 2.57 Thousand/uL      Absolute Eosinophils 0.00 Thousand/uL      Absolute Basophils 0.00 Thousand/uL      Total Counted --     RBC Morphology Normal     Platelet Estimate Adequate    Procalcitonin [[id number]]  (Abnormal) Collected: 03/04/25 1731    Lab Status: Final result Specimen: Blood from Arm, Left Updated: 03/04/25 1804     Procalcitonin 0.49 ng/ml     Lactic acid [[id number]]  (Normal) Collected: 03/04/25 1731    Lab Status: Final result Specimen: Blood from Arm, Left Updated: 03/04/25 1752     LACTIC ACID 1.4 mmol/L     Narrative:      Result may be elevated if tourniquet was used during collection.    Protime-INR [[id number]]  (Normal) Collected: 03/04/25 1731    Lab Status: Final result Specimen: Blood from Arm, Left Updated: 03/04/25 1752     Protime 14.3 seconds      INR 1.06    Narrative:      INR Therapeutic Range    Indication                                             INR Range      Atrial Fibrillation                                                2.0-3.0  Hypercoagulable State                                    2.0.2.3  Left Ventricular Asist Device                            2.0-3.0  Mechanical Heart Valve                                  -    Aortic(with afib, MI, embolism, HF, LA enlargement,    and/or coagulopathy)                                     2.0-3.0 (2.5-3.5)     Mitral                                                             2.5-3.5  Prosthetic/Bioprosthetic Heart Valve               2.0-3.0  Venous thromboembolism (VTE: VT, PE        2.0-3.0    APTT [[id number]]  (Normal) Collected: 03/04/25 1731    Lab Status: Final result Specimen: Blood from Arm, Left Updated: 03/04/25 1752     PTT 34 seconds     COVID/FLU/RSV [[id number]]  (Abnormal) Collected: 03/04/25 1708    Lab Status: Final result Specimen: Nares from Nose Updated: 03/04/25 1750     SARS-CoV-2 Negative     INFLUENZA A PCR Positive     INFLUENZA B PCR Negative     RSV PCR Negative    Narrative:      This test has been performed using the CoV-2/Flu/RSV plus assay on the [employer] platform. This test has been validated by the  and verified by the performing laboratory.     This test is designed to amplify and detect the following: nucleocapsid (N), envelope (E), and RNA-dependent RNA polymerase (RdRP) genes of the SARS-CoV-2 genome; matrix (M), basic polymerase (PB2), and acidic protein (PA) segments of the influenza A genome; matrix (M) and non-structural protein (NS) segments of the influenza B genome, and the nucleocapsid genes of RSV A and RSV B.     Positive results are indicative of the presence of Flu A, Flu B, RSV, and/or SARS-CoV-2 RNA. Positive results for SARS-CoV-2 or suspected novel influenza should be reported to state, local, or federal health departments according to local reporting requirements.      All results should be assessed in conjunction with clinical presentation and other laboratory markers for clinical management.     FOR PEDIATRIC  PATIENTS - copy/paste COVID Guidelines URL to browser: [email or web address]       HS Troponin 0hr (reflex protocol) [[id number]]  (Normal) Collected: 03/04/25 1708    Lab Status: Final result Specimen: Blood from Arm, Left Updated: 03/04/25 1742     hs TnI 0hr 35 ng/L     HS Troponin I 2hr [[id number]]     Lab Status: No result Specimen: Blood     Blood culture #1 [[id number]] Collected: 03/04/25 1731    Lab Status: In process Specimen: Blood from Arm, Left Updated: 03/04/25 1736    Blood culture #2 [[id number]] Collected: 03/04/25 1731    Lab Status: In process Specimen: Blood from Arm, Left Updated: 03/04/25 1736    Comprehensive metabolic panel [[id number]]  (Abnormal) Collected: 03/04/25 1708    Lab Status: Final result Specimen: Blood from Arm, Left Updated: 03/04/25 1734     Sodium 137 mmol/L      Potassium 3.2 mmol/L      Chloride 98 mmol/L      CO2 29 mmol/L      ANION GAP 10 mmol/L      BUN 32 mg/dL      Creatinine 0.67 mg/dL      Glucose 125 mg/dL      Calcium 9.6 mg/dL      AST 48 U/L      ALT 27 U/L      Alkaline Phosphatase 71 U/L      Total Protein 7.7 g/dL      Albumin 4.4 g/dL      Total Bilirubin 1.01 mg/dL      eGFR 93 ml/min/1.73sq m     Narrative:      National Kidney Disease Foundation guidelines for Chronic Kidney Disease (CKD):     Stage 1 with normal or high GFR (GFR > 90 mL/min/1.73 square meters)    Stage 2 Mild CKD (GFR = 60-89 mL/min/1.73 square meters)    Stage 3A Moderate CKD (GFR = 45-59 mL/min/1.73 square meters)    Stage 3B Moderate CKD (GFR = 30-44 mL/min/1.73 square meters)    Stage 4 Severe CKD (GFR = 15-29 mL/min/1.73 square meters)    Stage 5 End Stage CKD (GFR <15 mL/min/1.73 square meters)  Note: GFR calculation is accurate only with a steady state creatinine            CT chest without contrast   Final Interpretation by Nayla Gillespie MD (03/04 1826)      1) Diffuse bronchial wall thickening, suggestive of bronchitis or  reactive airways disease.      2) Evaluation of lung parenchyma significantly limited by motion artifact, in particular in the lower lungs. Suggestion of some tree-in-bud nodularity in the right middle lobe, and some tubular opacities in the right lower lobe, likely infectious.      3) Mild mediastinal lymphadenopathy, likely reactive.                           Workstation performed: [id number]         XR chest 2 views    (Results Pending)       ECG 12 Lead Documentation Only    Date/Time: 3/4/2025 7:48 PM    Performed by: Dominic Archer PA-C  Authorized by: Dominic Archer PA-C    Indications / Diagnosis:  Chest tightness with coughing  ECG reviewed by me, the ED Provider: yes    Patient location:  ED  Previous ECG:     Previous ECG:  Unavailable  Interpretation:     Interpretation: abnormal    Rate:     ECG rate:  71    ECG rate assessment: normal    Rhythm:     Rhythm: sinus rhythm    Ectopy:     Ectopy: none    QRS:     QRS axis:  Normal    QRS intervals:  Normal  Conduction:     Conduction: normal    ST segments:     ST segments:  Non-specific  T waves:     T waves: non-specific        ED Medication and Procedure Management   Prior to Admission Medications   Prescriptions Last Dose Informant Patient Reported? Taking?   atenolol (TENORMIN) 50 mg tablet   No No   Sig: Take 1 tablet by mouth once daily   atorvastatin (LIPITOR) 20 mg tablet   No No   Sig: Take 1 tablet by mouth once daily   hydroCHLOROthiazide 25 mg tablet   No No   Sig: Take 1 tablet by mouth once daily   levothyroxine 125 mcg tablet   No No   Sig: Take 1 tablet by mouth once daily      Facility-Administered Medications: None     Patient's Medications   Discharge Prescriptions    No medications on file     No discharge procedures on file.  ED SEPSIS DOCUMENTATION   Time reflects when diagnosis was documented in both MDM as applicable and the Disposition within this note       Time User Action Codes Description Comment    3/4/2025  7:16  PM Dominic Archer Add [J10.1] Influenza A     3/4/2025  7:16 PM Dominic Archer Add [R09.02] Hypoxia                  Dominic Archer PA-C  03/04/25 1950

## 2025-03-04 NOTE — PROGRESS NOTES
[facility]        NAME: Joann Khanna is a 64 y.o. female  : 1960    MRN: [id number]  DATE: 2025  TIME: 4:37 PM    Assessment and Plan   Shortness of breath [R06.02]  1. Shortness of breath  Transfer to other facility      2. Fever, unspecified fever cause            Placed on 3 L of oxygen. Proceeding to ER for further evaluation.     Patient Instructions       Follow up with PCP in 3-5 days.  Proceed to  ER if symptoms worsen.    If tests have been performed at [facility], our office will contact you with results if changes need to be made to the care plan discussed with you at the visit.  You can review your full results on [facility].    Chief Complaint     Chief Complaint   Patient presents with    Cold Like Symptoms     Pt reports fever and decreased appetite with body aches. C/o onset of symptoms on . C/o sob. Managing with Mucinex with some relief. On arrival o2 stat 88% denies any hx copd. Daughter reports not baseline activity level.          History of Present Illness       Patient presents with fever, decreased appetite, body aches, fatigue, shortness of breath, feeling sluggish starting 2 days ago.  Denies chest pains or palpitations.        Review of Systems   Review of Systems   Constitutional:  Positive for fatigue and fever.   Respiratory:  Positive for shortness of breath.    Cardiovascular:  Negative for chest pain and palpitations.   Musculoskeletal:  Positive for arthralgias and myalgias.         Current Medications       Current Outpatient Medications:     atenolol (TENORMIN) 50 mg tablet, Take 1 tablet by mouth once daily, Disp: 30 tablet, Rfl: 0    atorvastatin (LIPITOR) 20 mg tablet, Take 1 tablet by mouth once daily, Disp: 30 tablet, Rfl: 2    hydroCHLOROthiazide 25 mg tablet, Take 1 tablet by mouth once daily, Disp: 90 tablet, Rfl: 0    levothyroxine 125 mcg tablet, Take 1 tablet by mouth once daily, Disp: 90 tablet, Rfl: 0    Current Allergies  "    Allergies as of 03/04/2025    (No Known Allergies)            The following portions of the patient's history were reviewed and updated as appropriate: allergies, current medications, past family history, past medical history, past social history, past surgical history and problem list.     Past Medical History:   Diagnosis Date    H/O thyroidectomy     HTN (hypertension)     Hyperlipidemia     Hypothyroidism, postop        Past Surgical History:   Procedure Laterality Date    THYROIDECTOMY  09/21/2010    TUBAL LIGATION      WISDOM TOOTH EXTRACTION         Family History   Problem Relation Age of Onset    Atrial fibrillation Father     Hypertension Father     Stroke Father     Kidney cancer Brother          Medications have been verified.        Objective   /80 (BP Location: Right arm, Patient Position: Sitting, Cuff Size: Standard)   Pulse 83   Temp 100.4 °F (38 °C) (Tympanic)   Resp 20   Ht 5' 6\" (1.676 m)   Wt 96.2 kg (212 lb)   SpO2 (!) 88% Comment: 90% 3l o2  BMI 34.22 kg/m²   No LMP recorded. Patient is postmenopausal.       Physical Exam     Physical Exam  Constitutional:       Appearance: She is ill-appearing.   Cardiovascular:      Rate and Rhythm: Normal rate and regular rhythm.   Pulmonary:      Breath sounds: Rhonchi and rales (right lung fields worse in lower lobes) present.   Neurological:      Mental Status: She is alert.                   "

## 2025-03-05 PROBLEM — J96.01 ACUTE RESPIRATORY FAILURE WITH HYPOXIA (HCC): Status: ACTIVE | Noted: 2025-03-04

## 2025-03-05 LAB
ALBUMIN SERPL BCG-MCNC: 3.9 G/DL (ref 3.5–5)
ALP SERPL-CCNC: 59 U/L (ref 34–104)
ALT SERPL W P-5'-P-CCNC: 24 U/L (ref 7–52)
ANION GAP SERPL CALCULATED.3IONS-SCNC: 9 MMOL/L (ref 4–13)
AST SERPL W P-5'-P-CCNC: 44 U/L (ref 13–39)
ATRIAL RATE: 71 BPM
BASOPHILS # BLD AUTO: 0.03 THOUSANDS/ÂΜL (ref 0–0.1)
BASOPHILS NFR BLD AUTO: 0 % (ref 0–1)
BILIRUB SERPL-MCNC: 0.64 MG/DL (ref 0.2–1)
BUN SERPL-MCNC: 35 MG/DL (ref 5–25)
CALCIUM SERPL-MCNC: 8.8 MG/DL (ref 8.4–10.2)
CHLORIDE SERPL-SCNC: 100 MMOL/L (ref 96–108)
CO2 SERPL-SCNC: 29 MMOL/L (ref 21–32)
CREAT SERPL-MCNC: 0.61 MG/DL (ref 0.6–1.3)
EOSINOPHIL # BLD AUTO: 0 THOUSAND/ÂΜL (ref 0–0.61)
EOSINOPHIL NFR BLD AUTO: 0 % (ref 0–6)
ERYTHROCYTE [DISTWIDTH] IN BLOOD BY AUTOMATED COUNT: 15.4 % (ref 11.6–15.1)
GFR SERPL CREATININE-BSD FRML MDRD: 96 ML/MIN/1.73SQ M
GLUCOSE SERPL-MCNC: 122 MG/DL (ref 65–140)
HCT VFR BLD AUTO: 43 % (ref 34.8–46.1)
HGB BLD-MCNC: 13.8 G/DL (ref 11.5–15.4)
IMM GRANULOCYTES # BLD AUTO: 0.07 THOUSAND/UL (ref 0–0.2)
IMM GRANULOCYTES NFR BLD AUTO: 1 % (ref 0–2)
LYMPHOCYTES # BLD AUTO: 0.86 THOUSANDS/ÂΜL (ref 0.6–4.47)
LYMPHOCYTES NFR BLD AUTO: 7 % (ref 14–44)
MAGNESIUM SERPL-MCNC: 2.4 MG/DL (ref 1.9–2.7)
MCH RBC QN AUTO: 28.8 PG (ref 26.8–34.3)
MCHC RBC AUTO-ENTMCNC: 32.1 G/DL (ref 31.4–37.4)
MCV RBC AUTO: 90 FL (ref 82–98)
MONOCYTES # BLD AUTO: 0.83 THOUSAND/ÂΜL (ref 0.17–1.22)
MONOCYTES NFR BLD AUTO: 6 % (ref 4–12)
NEUTROPHILS # BLD AUTO: 11.33 THOUSANDS/ÂΜL (ref 1.85–7.62)
NEUTS SEG NFR BLD AUTO: 86 % (ref 43–75)
NRBC BLD AUTO-RTO: 0 /100 WBCS
P AXIS: -15 DEGREES
PLATELET # BLD AUTO: 234 THOUSANDS/UL (ref 149–390)
PMV BLD AUTO: 9.6 FL (ref 8.9–12.7)
POTASSIUM SERPL-SCNC: 3 MMOL/L (ref 3.5–5.3)
PR INTERVAL: 158 MS
PROCALCITONIN SERPL-MCNC: 0.51 NG/ML
PROT SERPL-MCNC: 7.1 G/DL (ref 6.4–8.4)
QRS AXIS: 30 DEGREES
QRSD INTERVAL: 94 MS
QT INTERVAL: 370 MS
QTC INTERVAL: 402 MS
RBC # BLD AUTO: 4.79 MILLION/UL (ref 3.81–5.12)
SODIUM SERPL-SCNC: 138 MMOL/L (ref 135–147)
T WAVE AXIS: 255 DEGREES
VENTRICULAR RATE: 71 BPM
WBC # BLD AUTO: 13.12 THOUSAND/UL (ref 4.31–10.16)

## 2025-03-05 PROCEDURE — 83735 ASSAY OF MAGNESIUM: CPT | Performed by: STUDENT IN AN ORGANIZED HEALTH CARE EDUCATION/TRAINING PROGRAM

## 2025-03-05 PROCEDURE — 85025 COMPLETE CBC W/AUTO DIFF WBC: CPT | Performed by: STUDENT IN AN ORGANIZED HEALTH CARE EDUCATION/TRAINING PROGRAM

## 2025-03-05 PROCEDURE — 93010 ELECTROCARDIOGRAM REPORT: CPT | Performed by: INTERNAL MEDICINE

## 2025-03-05 PROCEDURE — 84145 PROCALCITONIN (PCT): CPT | Performed by: STUDENT IN AN ORGANIZED HEALTH CARE EDUCATION/TRAINING PROGRAM

## 2025-03-05 PROCEDURE — 80053 COMPREHEN METABOLIC PANEL: CPT | Performed by: STUDENT IN AN ORGANIZED HEALTH CARE EDUCATION/TRAINING PROGRAM

## 2025-03-05 PROCEDURE — 94640 AIRWAY INHALATION TREATMENT: CPT

## 2025-03-05 PROCEDURE — 94760 N-INVAS EAR/PLS OXIMETRY 1: CPT

## 2025-03-05 PROCEDURE — 99233 SBSQ HOSP IP/OBS HIGH 50: CPT | Performed by: FAMILY MEDICINE

## 2025-03-05 RX ORDER — POTASSIUM CHLORIDE 1500 MG/1
40 TABLET, EXTENDED RELEASE ORAL ONCE
Status: COMPLETED | OUTPATIENT
Start: 2025-03-05 | End: 2025-03-05

## 2025-03-05 RX ORDER — POTASSIUM CHLORIDE 14.9 MG/ML
20 INJECTION INTRAVENOUS ONCE
Status: COMPLETED | OUTPATIENT
Start: 2025-03-05 | End: 2025-03-06

## 2025-03-05 RX ADMIN — IPRATROPIUM BROMIDE 0.5 MG: 0.5 SOLUTION RESPIRATORY (INHALATION) at 07:14

## 2025-03-05 RX ADMIN — HEPARIN SODIUM 5000 UNITS: 5000 INJECTION INTRAVENOUS; SUBCUTANEOUS at 05:18

## 2025-03-05 RX ADMIN — ATENOLOL 50 MG: 50 TABLET ORAL at 08:59

## 2025-03-05 RX ADMIN — ACETAMINOPHEN 650 MG: 325 TABLET, FILM COATED ORAL at 03:25

## 2025-03-05 RX ADMIN — LEVALBUTEROL HYDROCHLORIDE 1.25 MG: 1.25 SOLUTION RESPIRATORY (INHALATION) at 19:59

## 2025-03-05 RX ADMIN — IPRATROPIUM BROMIDE 0.5 MG: 0.5 SOLUTION RESPIRATORY (INHALATION) at 14:02

## 2025-03-05 RX ADMIN — HYDROCHLOROTHIAZIDE 25 MG: 25 TABLET ORAL at 08:59

## 2025-03-05 RX ADMIN — LEVALBUTEROL HYDROCHLORIDE 1.25 MG: 1.25 SOLUTION RESPIRATORY (INHALATION) at 14:02

## 2025-03-05 RX ADMIN — OSELTAMIVIR PHOSPHATE 75 MG: 75 CAPSULE ORAL at 08:59

## 2025-03-05 RX ADMIN — POTASSIUM CHLORIDE 40 MEQ: 1500 TABLET, EXTENDED RELEASE ORAL at 23:08

## 2025-03-05 RX ADMIN — BENZONATATE 100 MG: 100 CAPSULE ORAL at 08:59

## 2025-03-05 RX ADMIN — IPRATROPIUM BROMIDE 0.5 MG: 0.5 SOLUTION RESPIRATORY (INHALATION) at 19:59

## 2025-03-05 RX ADMIN — HEPARIN SODIUM 5000 UNITS: 5000 INJECTION INTRAVENOUS; SUBCUTANEOUS at 21:05

## 2025-03-05 RX ADMIN — OSELTAMIVIR PHOSPHATE 75 MG: 75 CAPSULE ORAL at 21:05

## 2025-03-05 RX ADMIN — ATORVASTATIN CALCIUM 20 MG: 20 TABLET, FILM COATED ORAL at 17:14

## 2025-03-05 RX ADMIN — HEPARIN SODIUM 5000 UNITS: 5000 INJECTION INTRAVENOUS; SUBCUTANEOUS at 13:23

## 2025-03-05 RX ADMIN — AZITHROMYCIN MONOHYDRATE 500 MG: 500 INJECTION, POWDER, LYOPHILIZED, FOR SOLUTION INTRAVENOUS at 20:28

## 2025-03-05 RX ADMIN — LEVALBUTEROL HYDROCHLORIDE 1.25 MG: 1.25 SOLUTION RESPIRATORY (INHALATION) at 07:14

## 2025-03-05 RX ADMIN — LEVOTHYROXINE SODIUM 125 MCG: 25 TABLET ORAL at 05:18

## 2025-03-05 RX ADMIN — POTASSIUM CHLORIDE 20 MEQ: 14.9 INJECTION, SOLUTION INTRAVENOUS at 23:16

## 2025-03-05 RX ADMIN — CEFTRIAXONE 1000 MG: 1 INJECTION, SOLUTION INTRAVENOUS at 17:14

## 2025-03-05 NOTE — ASSESSMENT & PLAN NOTE
SIRS: Fever (100.6) + WBC 16.05   Procal: 0.49. continue to trend   Lactic WNL   Likely viral sepsis due to Influenza infection:   Possible superimposed bacterial infection  CT chest wo contrast   Diffuse bronchial wall thickening, suggestive of bronchitis or reactive airways disease.  Evaluation of lung parenchyma significantly limited by motion artifact, in particular in the lower lungs. Suggestion of some tree-in-bud nodularity in the right middle lobe, and some tubular opacities in the right lower lobe, likely infectious.  Mild mediastinal lymphadenopathy, likely reactive.  Covid/RSV negative   BC pending   PNA labs/procalcitonin x2 elevated  Abx: IV azithromycin + IV ceftriaxone initiated in the ER. Continue

## 2025-03-05 NOTE — ASSESSMENT & PLAN NOTE
88% on RA   Requiring 3 L NC   Suspect hypoxia in setting of Influenza A, bronchitis  Receiving tamiflu, IV ceftriaxone and azithromycin   IV nebs   Respiratory protocol   Wean oxygen as able   Low threshold to start steroids

## 2025-03-05 NOTE — H&P
H&P - Hospitalist   Name: Joann Khanna 64 y.o. female I MRN: [id number]  Unit/Bed#: -01 I Date of Admission: 3/4/2025   Date of Service: 3/5/2025 I Hospital Day: 1     Assessment & Plan  Influenza A  Cough, congestion, SOB, generalized weakness over the past couple of days  88% on RA. Requiring 3 L NC   Wean oxygen as able   Meeting SIRS   Flu A +   Started on tamiflu   Manage symptoms   Neb treatments  Sepsis (HCC)  SIRS: Fever (100.6) + WBC 16.05   Procal: 0.49. continue to trend   Lactic WNL   SOI:   Influenza A   CT chest wo contrast   Diffuse bronchial wall thickening, suggestive of bronchitis or reactive airways disease.  Evaluation of lung parenchyma significantly limited by motion artifact, in particular in the lower lungs. Suggestion of some tree-in-bud nodularity in the right middle lobe, and some tubular opacities in the right lower lobe, likely infectious.  Mild mediastinal lymphadenopathy, likely reactive.  Covid/RSV negative   BC pending   PNA labs pending   Abx: IV azithromycin + IV ceftriaxone initiated in the ER. Continue   Hypoxia  88% on RA   Requiring 3 L NC   Suspect hypoxia in setting of Influenza A, bronchitis  Receiving tamiflu, IV ceftriaxone and azithromycin   IV nebs   Respiratory protocol   Wean oxygen as able   Low threshold to start steroids   Hypokalemia  Potassium 3.2   Decreased oral intake in setting of illness   Also on HCTZ 25 mg daily   Replete   Continue to monitor   Hypertension  Home regimen:   HCTZ 25 mg daily   Atenolol 50 mg daily   Postoperative hypothyroidism  Continue levothyroxine   Mild hyperlipidemia  Continue statin   Nicotine dependence  Encourage cessation  Smokes a little less than a pack per day  Denies prior COPD diagnosis  Patch offered but declined      VTE Pharmacologic Prophylaxis: VTE Score: 3 Moderate Risk (Score 3-4) - Pharmacological DVT Prophylaxis Ordered: heparin.  Code Status: Level 1 - Full Code   Discussion with family: Patient declined call  to .     Anticipated Length of Stay: Patient will be admitted on an inpatient basis with an anticipated length of stay of greater than 2 midnights secondary to influenza A.    History of Present Illness   Chief Complaint: short of breath    Joann Khanna is a 64 y.o. female with a PMH of hypothyroidism, nicotine abuse, hyperlipidemia, hypertension who presents with cough, congestion, shortness of breath, generalized weakness over the past couple of days.  Has felt feverish at times but has not taken her temperature.  Typically smokes a little less than a pack a day.  However has not had the energy to smoke in about a week.  In the ER 88% on room air.  Requiring 3 L nasal cannula.  Tested positive for flu A.  On CT chest with signs of bronchitis.  Started on Tamiflu and IV antibiotics.  Patient reports compliance with her oral medications.  Not prescribed inhalers or neb treatments outpatient.     Review of Systems   Constitutional:  Negative for fatigue and fever.   HENT:  Positive for congestion. Negative for sore throat.    Respiratory:  Positive for cough and shortness of breath. Negative for chest tightness.    Cardiovascular:  Negative for chest pain.   Gastrointestinal:  Negative for abdominal distention, abdominal pain, diarrhea, nausea and vomiting.   Genitourinary:  Negative for difficulty urinating.   Musculoskeletal:  Negative for arthralgias.   Neurological:  Positive for weakness. Negative for headaches.   Psychiatric/Behavioral:  Negative for agitation and behavioral problems.    All other systems reviewed and are negative.      Historical Information   Past Medical History:   Diagnosis Date    H/O thyroidectomy     HTN (hypertension)     Hyperlipidemia     Hypothyroidism, postop      Past Surgical History:   Procedure Laterality Date    THYROIDECTOMY  09/21/2010    TUBAL LIGATION      WISDOM TOOTH EXTRACTION       Social History     Tobacco Use    Smoking status: Every Day     Current  packs/day: 1.00     Average packs/day: 1 pack/day for 40.0 years (40.0 ttl pk-yrs)     Types: Cigarettes    Smokeless tobacco: Never    Tobacco comments:     smoking a little less than a pack a day.     Vaping Use    Vaping status: Never Used   Substance and Sexual Activity    Alcohol use: No    Drug use: No    Sexual activity: Not Currently     E-Cigarette/Vaping    E-Cigarette Use Never User      E-Cigarette/Vaping Substances    Nicotine No     THC No     CBD No     Flavoring No     Other No     Unknown No      Family History   Problem Relation Age of Onset    Atrial fibrillation Father     Hypertension Father     Stroke Father     Kidney cancer Brother      Social History:  Marital Status:    Occupation: Known  Patient Pre-hospital Living Situation: Home  Patient Pre-hospital Level of Mobility: walks  Patient Pre-hospital Diet Restrictions: Regular    Meds/Allergies   I have reviewed home medications with patient personally.  Prior to Admission medications    Medication Sig Start Date End Date Taking? Authorizing Provider   atenolol (TENORMIN) 50 mg tablet Take 1 tablet by mouth once daily 2/17/25   Morelia Castillo, DO   atorvastatin (LIPITOR) 20 mg tablet Take 1 tablet by mouth once daily 1/20/25   Morelia Castillo, DO   hydroCHLOROthiazide 25 mg tablet Take 1 tablet by mouth once daily 1/7/25   Morelia Castillo, DO   levothyroxine 125 mcg tablet Take 1 tablet by mouth once daily 2/12/25   Morelia Castillo, DO     No Known Allergies    Objective :  Temp:  [98.1 °F (36.7 °C)-100.6 °F (38.1 °C)] 98.1 °F (36.7 °C)  HR:  [56-83] 62  BP: (100-146)/(53-80) 100/65  Resp:  [20-24] 24  SpO2:  [88 %-97 %] 94 %  O2 Device: Nasal cannula  Nasal Cannula O2 Flow Rate (L/min):  [2 L/min-3 L/min] 3 L/min    Physical Exam  Vitals and nursing note reviewed.   Constitutional:       Appearance: Normal appearance. She is obese. She is ill-appearing.      Interventions: Nasal cannula in place.   HENT:      Head:  "Normocephalic.   Eyes:      Extraocular Movements: Extraocular movements intact.      Pupils: Pupils are equal, round, and reactive to light.   Cardiovascular:      Rate and Rhythm: Normal rate and regular rhythm.      Heart sounds: No murmur heard.  Pulmonary:      Effort: Pulmonary effort is normal. No respiratory distress.      Breath sounds: Wheezing present.   Abdominal:      General: Bowel sounds are normal. There is no distension.      Tenderness: There is no abdominal tenderness. There is no guarding.   Musculoskeletal:         General: Normal range of motion.      Cervical back: Normal range of motion.      Right lower leg: No edema.      Left lower leg: No edema.   Skin:     General: Skin is warm.   Neurological:      General: No focal deficit present.      Mental Status: She is alert and oriented to person, place, and time.   Psychiatric:         Mood and Affect: Mood normal.         Behavior: Behavior normal.         Thought Content: Thought content normal.          Lines/Drains:            Lab Results: I have reviewed the following results:  Results from last 7 days   Lab Units 03/04/25  1708   WBC Thousand/uL 16.05*   HEMOGLOBIN g/dL 15.4   HEMATOCRIT % 47.1*   PLATELETS Thousands/uL 247   BANDS PCT % 4   LYMPHO PCT % 11*   MONO PCT % 16*   EOS PCT % 0     Results from last 7 days   Lab Units 03/04/25  1708   SODIUM mmol/L 137   POTASSIUM mmol/L 3.2*   CHLORIDE mmol/L 98   CO2 mmol/L 29   BUN mg/dL 32*   CREATININE mg/dL 0.67   ANION GAP mmol/L 10   CALCIUM mg/dL 9.6   ALBUMIN g/dL 4.4   TOTAL BILIRUBIN mg/dL 1.01*   ALK PHOS U/L 71   ALT U/L 27   AST U/L 48*   GLUCOSE RANDOM mg/dL 125     Results from last 7 days   Lab Units 03/04/25  1731   INR  1.06         No results found for: \"HGBA1C\"  Results from last 7 days   Lab Units 03/04/25  1731   LACTIC ACID mmol/L 1.4   PROCALCITONIN ng/ml 0.49*       Imaging Results Review: I reviewed radiology reports from this admission including: CT chest.  Other " Study Results Review: EKG was reviewed.         ** Please Note: This note has been constructed using a voice recognition system. **

## 2025-03-05 NOTE — ASSESSMENT & PLAN NOTE
Cough, congestion, SOB, generalized weakness over the past couple of days  88% on RA. Requiring 3 L NC   Wean oxygen as able   Meeting SIRS   Flu A +   Started on Tamiflu, continue  Manage symptoms   Neb treatments

## 2025-03-05 NOTE — ASSESSMENT & PLAN NOTE
Potassium 3.2   Decreased oral intake in setting of illness   Also on HCTZ 25 mg daily   Replete   Continue to monitor

## 2025-03-05 NOTE — CASE MANAGEMENT
Case Management Assessment & Discharge Planning Note    Patient name Joann BUENO Clemencia  Location /-01 MRN [id number]  : 1960 Date 3/5/2025       Current Admission Date: 3/4/2025  Current Admission Diagnosis:Influenza A   Patient Active Problem List    Diagnosis Date Noted Date Diagnosed    Influenza A 2025     Sepsis (HCC) 2025     Hypoxia 2025     Hypokalemia 2025     Class 1 obesity due to excess calories in adult 2023     Mild hyperlipidemia 11/10/2016     Nicotine dependence 2015     Graves' disease with exophthalmos 2013     Hypertension 2012     Postoperative hypothyroidism 2012       LOS (days): 1  Geometric Mean LOS (GMLOS) (days): 3.5  Days to GMLOS:2.7     OBJECTIVE:    Risk of Unplanned Readmission Score: 11.12         Current admission status: Inpatient       Preferred Pharmacy:   [facility] [address] [place] [id number]  Phone: [phone] Fax: [phone]    Primary Care Provider: Morelia Castillo DO    Primary Insurance: AETNA  Secondary Insurance:     ASSESSMENT:  Active Health Care Proxies    There are no active Health Care Proxies on file.       Advance Directives  Does patient have a Health Care POA?: No  Was patient offered paperwork?: Yes              Patient Information  Admitted from:: Home  Mental Status: Alert  During Assessment patient was accompanied by: Not accompanied during assessment  Assessment information provided by:: Patient  Primary Caregiver: Self  Support Systems: Self  What city do you live in?: [place]  Home entry access options. Select all that apply.: Stairs  Number of steps to enter home.: 1  Do the steps have railings?: No  Type of Current Residence: [address] [place] [address] home  Upon entering residence, is there a bedroom on the main floor (no further steps)?: Yes  Upon entering residence, is there a bathroom on the main floor (no further  steps)?: Yes  Living Arrangements: Lives w/ Son  Is patient a ?: No    Activities of Daily Living Prior to Admission  Functional Status: Independent  Completes ADLs independently?: Yes  Ambulates independently?: Yes  Does patient use assisted devices?: No  Does patient currently own DME?: No  Does patient have a history of Outpatient Therapy (PT/OT)?: No  Does the patient have a history of Short-Term Rehab?: No  Does patient have a history of HHC?: No  Does patient currently have HHC?: No         Patient Information Continued  Income Source: Employed  Does patient have prescription coverage?: Yes  Does patient receive dialysis treatments?: No  Does patient have a history of substance abuse?: No  Does patient have a history of Mental Health Diagnosis?: No         Means of [facility]          DISCHARGE DETAILS:    Discharge planning discussed with:: Pt  Freedom of Choice: Yes     CM contacted family/caregiver?: No- see comments (pt declined)  Were Treatment Team discharge recommendations reviewed with patient/caregiver?: Yes  Did patient/caregiver verbalize understanding of patient care needs?: Yes  Were patient/caregiver advised of the risks associated with not following Treatment Team discharge recommendations?: Yes         Requested Home Health Care         Is the patient interested in HHC at discharge?: No            Additional Comments: Spoke with the pt to review CM role and possible discharge planning needs. Pt is on contcat and droplet isolation for Flu A and is septic. Pt lives with her son in a 3SH with 1 step to enter. Pt stated that she has been independent with all ADL care prior to admission. Pt has no hx with DME, HHC or STR placement. Pt reports no hx with D&A or inpt MH stays. Pt reports no concerns about her ability to return home and care for herself. Pt's son will provide transport to home.

## 2025-03-05 NOTE — UTILIZATION REVIEW
NOTIFICATION OF INPATIENT ADMISSION   AUTHORIZATION REQUEST   SERVICING FACILITY:   [place] [address]  Tax ID: [id number]  NPI: [id number] ATTENDING PROVIDER:  Attending Name and NPI#: Carla Amador Md [[id number]]  Address: [address] [place] [address]  Phone: [phone]   ADMISSION INFORMATION:  Place of Service: Inpatient [facility]  Place of Service Code: 21  Inpatient Admission Date/Time: 3/4/25  7:18 PM  Discharge Date/Time: No discharge date for patient encounter.  Admitting Diagnosis Code/Description:  SOB (shortness of breath) [R06.02]  Influenza A [J10.1]  Hypoxia [R09.02]     UTILIZATION REVIEW CONTACT:  Nerissa Brito Utilization   Network Utilization Review Department  Phone: [phone]  Fax: [phone]  Email: [email or web address]  Contact for approvals/pending authorizations, clinical reviews, and discharge.     PHYSICIAN ADVISORY SERVICES:  Medical Necessity Denial & [employer] Review  Phone: [phone]  Fax: [phone]  Email: [email or web address]     DISCHARGE SUPPORT TEAM:  For Patients Discharge Needs & Updates  Phone: [phone] opt. 2 Fax: [phone]  Email: [email or web address]

## 2025-03-05 NOTE — ASSESSMENT & PLAN NOTE
88% on RA   Requiring 3 L NC   Suspect hypoxia in setting of Influenza A, bronchitis  Receiving tamiflu, IV ceftriaxone and azithromycin   IV nebs   Respiratory protocol   Wean oxygen as able   Start Solumedrol 40 mg IV BID  Will need outpatient PFTs  Home O2 eval prior to d/c

## 2025-03-05 NOTE — ASSESSMENT & PLAN NOTE
Potassium 3.2   Decreased oral intake in setting of illness   Also on HCTZ 25 mg daily   Continue to replace and monitor electrolytes

## 2025-03-05 NOTE — RESPIRATORY THERAPY NOTE
RT Protocol Note  Joann Khanna 64 y.o. female MRN: [id number]  Unit/Bed#: -01 Encounter: [id number]    Assessment    Principal Problem:    Influenza A  Active Problems:    Hypertension    Postoperative hypothyroidism    Mild hyperlipidemia    Nicotine dependence    Sepsis (HCC)    Hypoxia    Hypokalemia      Home Pulmonary Medications:None       Past Medical History:   Diagnosis Date    H/O thyroidectomy     HTN (hypertension)     Hyperlipidemia     Hypothyroidism, postop      Social History     Socioeconomic History    Marital status:      Spouse name: None    Number of children: None    Years of education: None    Highest education level: None   Occupational History    Occupation: full time   Tobacco Use    Smoking status: Every Day     Current packs/day: 1.00     Average packs/day: 1 pack/day for 40.0 years (40.0 ttl pk-yrs)     Types: Cigarettes    Smokeless tobacco: Never    Tobacco comments:     smoking a little less than a pack a day.     Vaping Use    Vaping status: Never Used   Substance and Sexual Activity    Alcohol use: No    Drug use: No    Sexual activity: Not Currently   Other Topics Concern    None   Social History Narrative    None     Social Drivers of Health     Financial Resource Strain: Low Risk  (6/10/2020)    Overall Financial Resource Strain (CARDIA)     Difficulty of Paying Living Expenses: Not very hard   Food Insecurity: No Food Insecurity (3/4/2025)    Nursing - Inadequate Food Risk Classification     Worried About Running Out of Food in the Last Year: Not on file     Ran Out of Food in the Last Year: Not on file     Ran Out of Food in the Last Year: Never true   Transportation Needs: No Transportation Needs (3/4/2025)    Nursing - Transportation Risk Classification     Lack of Transportation: Not on file     Lack of Transportation: No   Physical Activity: Inactive (6/10/2020)    Exercise Vital Sign     Days of Exercise per Week: 0 days     Minutes of Exercise per Session:  0 min   Stress: Stress Concern Present (6/10/2020)    [employer] - Occupational Stress Questionnaire     Feeling of Stress : To some extent   Social Connections: Socially Isolated (6/10/2020)    Social Connection and Isolation Panel [NHANES]     Frequency of Communication with Friends and Family: More than three times a week     Frequency of Social Gatherings with Friends and Family: More than three times a week     Attends Christian Services: Never     Active Member of Clubs or Organizations: No     Attends Club or Organization Meetings: Never     Marital Status:    Intimate Partner Violence: Unknown (3/4/2025)    Nursing IPS     Feels Physically and Emotionally Safe: Not on file     Physically Hurt by Someone: Not on file     Humiliated or Emotionally Abused by Someone: Not on file     Physically Hurt by Someone: No     Hurt or Threatened by Someone: No   Housing Stability: Unknown (3/4/2025)    Nursing: Inadequate Housing Risk Classification     Has Housing: Not on file     Worried About Losing Housing: Not on file     Unable to Get Utilities: Not on file     Unable to Pay for Housing in the Last Year: No     Has Housin       Subjective         Objective    Physical Exam:   Assessment Type: (P) Assess only  General Appearance: (P) Alert, Awake  Respiratory Pattern: (P) Labored  Chest Assessment: (P) Chest expansion symmetrical  Bilateral Breath Sounds: (P) Rhonchi  Cough: (P) Non-productive, Dry    Vitals:  Blood pressure 100/65, pulse 62, temperature 98.1 °F (36.7 °C), resp. rate (!) (P) 24, SpO2 94%.          Imaging and other studies: Results Review Statement: I reviewed radiology reports from this admission including: chest xray.          Plan    Respiratory Plan: (P) Mild Distress pathway

## 2025-03-05 NOTE — PLAN OF CARE

## 2025-03-05 NOTE — UTILIZATION REVIEW
Initial Clinical Review    Admission: Date/Time/Statement:   Admission Orders (From admission, onward)       Ordered        03/04/25 1918  INPATIENT ADMISSION  Once                          Orders Placed This Encounter   Procedures    INPATIENT ADMISSION     Standing Status:   Standing     Number of Occurrences:   1     Level of Care:   Med Surg [16]     Estimated length of stay:   More than 2 Midnights     Certification:   I certify that inpatient services are medically necessary for this patient for a duration of greater than two midnights. See H&P and MD Progress Notes for additional information about the patient's course of treatment.     ED Arrival Information       Expected   3/4/2025     Arrival   3/4/2025 17:03    Acuity   Emergent              Means of arrival   Ambulance    Escorted by   [facility]    Service   Hospitalist    Admission type   Emergency              Arrival complaint   Shortness of breath             Chief Complaint   Patient presents with    Shortness of Breath     Pt to er via ems from  with reports of sob and a cough since Sunday. Oxygen at  in the high 80's. Duoneb en route with no relief.        Initial Presentation: 64 y.o. female  with a PMH of hypothyroidism, nicotine abuse, HL and HTN presents to the ED from home with complaint of  cough, congestion, shortness of breath, and generalized weakness over the past couple of days.  Has felt feverish at times but has not taken her temperature. Temp 100.6 on arrival to the ER,  room air O2 sat was 88% requiring 3 L nasal cannula.  Tested positive for flu A. CT chest with signs of bronchitis.  ED labs revealed WBC of 16,  procalcitonin 0.49, potassium 3.2.  Exam:  Wheezing present.     3/4 Inpatient admission for evaluation and treatment of Influenza A, sepsis, hypoxia, hypokalemia:  Tamiflu, IV azithromycin and ceftriaxone, blood cultures pending. Nebulizers. Wean O2 as able. Replete potassium and monitor.     3/5 Internal  Medicine:  Patient reports feeling improved, but still with cough and requiring supplemental oxygen. Continue Tamiflu. Procalcitonin elevated x2, continue IV antibiotics, blood cultures pending. Wean O2 as able. Home O2 eval prior to discharge. Continue to monitor and replace electrolytes.  Rhonchi on exam.      ED Treatment-Medication Administration from 03/04/2025 1632 to 03/04/2025 2128         Date/Time Order Dose Route Action     03/04/2025 1716 ipratropium-albuterol (FOR EMS ONLY) (DUO-NEB) 0.5-2.5 mg/3 mL inhalation solution 3 mL 0 mL Does not apply Given to EMS     03/04/2025 1729 sodium chloride 0.9 % bolus 500 mL 500 mL Intravenous New Bag     03/04/2025 1729 acetaminophen (Ofirmev) injection 1,000 mg 1,000 mg Intravenous New Bag     03/04/2025 1831 cefTRIAXone (ROCEPHIN) IVPB (premix in dextrose) 2,000 mg 50 mL 2,000 mg Intravenous New Bag     03/04/2025 1924 azithromycin (ZITHROMAX) 500 mg in sodium chloride 0.9% 250mL IVPB 500 mg 500 mg Intravenous New Bag     03/04/2025 1924 oseltamivir (TAMIFLU) capsule 75 mg 75 mg Oral Given            Scheduled Medications:      atenolol, 50 mg, Oral, Daily  atorvastatin, 20 mg, Oral, Daily With Dinner  azithromycin, 500 mg, Intravenous, Q24H  cefTRIAXone, 1,000 mg, Intravenous, Q24H  heparin (porcine), 5,000 Units, Subcutaneous, Q8H JERAD  hydroCHLOROthiazide, 25 mg, Oral, Daily  ipratropium, 0.5 mg, Nebulization, TID  levalbuterol, 1.25 mg, Nebulization, TID  levothyroxine, 125 mcg, Oral, Early Morning  oseltamivir, 75 mg, Oral, Q12H JERAD      Continuous IV Infusions: None.      PRN Meds:    acetaminophen, 650 mg, Oral, Q6H PRN  albuterol, 2 puff, Inhalation, Q4H PRN  albuterol, 2.5 mg, Nebulization, Q6H PRN  benzonatate, 100 mg, Oral, TID PRN  ondansetron, 4 mg, Intravenous, Q6H PRN      ED Triage Vitals   Temperature Pulse Respirations Blood Pressure SpO2 Pain Score   03/04/25 1708 03/04/25 1705 03/04/25 1705 03/04/25 1706 03/04/25 1705 03/04/25 2129   (!) 100.6 °F  (38.1 °C) 82 20 146/65 (!) 88 % No Pain     Weight (last 2 days)       None            Vital Signs (last 3 days)      Date/Time Temp Pulse Resp BP MAP (mmHg) SpO2 Calculated FIO2 (%) - Nasal Cannula O2 Flow Rate (L/min) Nasal Cannula O2 Flow Rate (L/min) O2 Device Patient Position - Orthostatic VS Pain   03/05/25 15:24:12 -- 64 -- 133/65 88 93 % -- -- -- -- -- --   03/05/25 1404 -- -- -- -- -- 95 % 32 -- 3 L/min Nasal cannula -- No Pain   03/05/25 07:21:47 -- 56 -- 118/58 78 98 % -- -- -- -- -- --   03/05/25 0716 -- -- -- -- -- -- 32 -- 3 L/min Nasal cannula -- --   03/05/25 0325 -- -- -- -- -- -- -- -- -- -- -- 5   03/04/25 2153 -- 62 24 -- -- 94 % -- -- -- -- -- --   03/04/25 2133 -- -- -- -- -- -- -- 3 L/min -- Nasal cannula -- No Pain   03/04/25 21:30:40 98.1 °F (36.7 °C) 64 -- 100/65 77 95 % -- -- -- -- -- --   03/04/25 2129 -- -- -- -- -- -- -- -- -- -- -- No Pain   03/04/25 2031 98.2 °F (36.8 °C) -- -- -- -- -- -- -- -- -- -- --   03/04/25 2030 -- 56 22 108/57 78 97 % -- -- -- -- -- --   03/04/25 2000 -- 57 20 104/53 75 95 % -- -- -- -- -- --   03/04/25 1952 -- -- -- -- -- -- -- 3 L/min -- Nasal cannula -- --   03/04/25 1930 -- 63 22 118/62 85 96 % -- -- -- -- -- --   03/04/25 1915 -- -- -- -- -- 96 % -- -- -- -- -- --   03/04/25 1900 -- 56 20 126/59 85 96 % 32 -- 3 L/min Nasal cannula -- --   03/04/25 1835 -- -- -- -- -- 88 %  -- -- -- None (Room air) -- --   03/04/25 1804 -- -- -- -- -- -- -- -- -- Nasal cannula -- --   03/04/25 1708 100.6 °F (38.1 °C) -- -- -- -- 92 % 28 -- 2 L/min Nasal cannula -- --   03/04/25 1706 -- -- -- 146/65 -- -- -- -- -- -- Lying --   03/04/25 1705 -- 82 20 -- -- 88 % -- -- -- None (Room air) -- --            Pertinent Labs/Diagnostic Test Results:     Radiology:  CT chest without contrast   Final Interpretation by Nayla Gillespie MD (03/04 1826)      1) Diffuse bronchial wall thickening, suggestive of bronchitis or reactive airways disease.      2) Evaluation of lung parenchyma  significantly limited by motion artifact, in particular in the lower lungs. Suggestion of some tree-in-bud nodularity in the right middle lobe, and some tubular opacities in the right lower lobe, likely infectious.      3) Mild mediastinal lymphadenopathy, likely reactive.                           Workstation performed: [id number]         XR chest 2 views   Final Interpretation by Ry Peraza MD (03/05 0728)      Prominent bibasilar bronchial markings may represent bronchitis. See subsequent chest CT report.            Workstation performed: [id number]           Cardiology:    3/4 EKG:      Previous ECG:  Unavailable   Interpretation:     Interpretation: abnormal    Rate:     ECG rate:  71     ECG rate assessment: normal    Rhythm:     Rhythm: sinus rhythm    Ectopy:     Ectopy: none    QRS:     QRS axis:  Normal     QRS intervals:  Normal   Conduction:     Conduction: normal    ST segments:     ST segments:  Non-specific   T waves:     T waves: non-specific       Results from last 7 days   Lab Units 03/04/25  1708   SARS-COV-2  Negative     Results from last 7 days   Lab Units 03/05/25 0445 03/04/25  1708   WBC Thousand/uL 13.12* 16.05*   HEMOGLOBIN g/dL 13.8 15.4   HEMATOCRIT % 43.0 47.1*   PLATELETS Thousands/uL 234 247   TOTAL NEUT ABS Thousands/µL 11.33*  --    BANDS PCT %  --  4         Results from last 7 days   Lab Units 03/05/25 0445 03/04/25  1708   SODIUM mmol/L 138 137   POTASSIUM mmol/L 3.0* 3.2*   CHLORIDE mmol/L 100 98   CO2 mmol/L 29 29   ANION GAP mmol/L 9 10   BUN mg/dL 35* 32*   CREATININE mg/dL 0.61 0.67   EGFR ml/min/1.73sq m 96 93   CALCIUM mg/dL 8.8 9.6   MAGNESIUM mg/dL 2.4  --      Results from last 7 days   Lab Units 03/05/25 0445 03/04/25  1708   AST U/L 44* 48*   ALT U/L 24 27   ALK PHOS U/L 59 71   TOTAL PROTEIN g/dL 7.1 7.7   ALBUMIN g/dL 3.9 4.4   TOTAL BILIRUBIN mg/dL 0.64 1.01*         Results from last 7 days   Lab Units 03/05/25 0445 03/04/25  1708   GLUCOSE  RANDOM mg/dL 122 125             Results from last 7 days   Lab Units 03/04/25  2031 03/04/25  1708   HS TNI 0HR ng/L  --  35   HS TNI 2HR ng/L 36  --    HSTNI D2 ng/L 1  --          Results from last 7 days   Lab Units 03/04/25  1731   PROTIME seconds 14.3   INR  1.06   PTT seconds 34         Results from last 7 days   Lab Units 03/05/25  0445 03/04/25  1731   PROCALCITONIN ng/ml 0.51* 0.49*     Results from last 7 days   Lab Units 03/04/25  1731   LACTIC ACID mmol/L 1.4               Results from last 7 days   Lab Units 03/04/25  1708   INFLUENZA A PCR  Positive*   INFLUENZA B PCR  Negative   RSV PCR  Negative           Results from last 7 days   Lab Units 03/04/25  1731   BLOOD CULTURE  Received in Microbiology Lab. Culture in Progress.  Received in Microbiology Lab. Culture in Progress.             Past Medical History:   Diagnosis Date    H/O thyroidectomy     HTN (hypertension)     Hyperlipidemia     Hypothyroidism, postop      Present on Admission:   Mild hyperlipidemia   Hypertension   Postoperative hypothyroidism   Nicotine dependence      Admitting Diagnosis: SOB (shortness of breath) [R06.02]  Influenza A [J10.1]  Hypoxia [R09.02]  Age/Sex: 64 y.o. female            Network Utilization Review Department  ATTENTION: Please call with any questions or concerns to [phone] and carefully listen to the prompts so that you are directed to the right person. All voicemails are confidential.   For Discharge needs, contact Care Management DC Support Team at [phone] opt. 2  Send all requests for admission clinical reviews, approved or denied determinations and any other requests to dedicated fax number below belonging to the campus where the patient is receiving treatment. List of dedicated fax numbers for the Facilities:  FACILITY NAME UR FAX NUMBER   ADMISSION DENIALS (Administrative/Medical Necessity) [phone]   DISCHARGE SUPPORT TEAM (NETWORK) [phone]   PARENT CHILD HEALTH  (Maternity/NICU/Pediatrics) [phone]   [facility] [phone]   [facility] [phone]   [facility] [phone]   [facility] [phone]   [facility] [phone]   [facility] [phone]   [facility] [phone]   [facility] [phone]   [facility] [phone]   [facility] [phone]   [facility] [phone]   [facility] [phone]

## 2025-03-05 NOTE — ASSESSMENT & PLAN NOTE
Cough, congestion, SOB, generalized weakness over the past couple of days  88% on RA. Requiring 3 L NC   Wean oxygen as able   Meeting SIRS   Flu A +   Started on tamiflu   Manage symptoms   Neb treatments

## 2025-03-05 NOTE — ASSESSMENT & PLAN NOTE
Encourage cessation  Smokes a little less than a pack per day  Denies prior COPD diagnosis  Patch offered but declined   3 = A little assistance

## 2025-03-05 NOTE — ASSESSMENT & PLAN NOTE
SIRS: Fever (100.6) + WBC 16.05   Procal: 0.49. continue to trend   Lactic WNL   SOI:   Influenza A   CT chest wo contrast   Diffuse bronchial wall thickening, suggestive of bronchitis or reactive airways disease.  Evaluation of lung parenchyma significantly limited by motion artifact, in particular in the lower lungs. Suggestion of some tree-in-bud nodularity in the right middle lobe, and some tubular opacities in the right lower lobe, likely infectious.  Mild mediastinal lymphadenopathy, likely reactive.  Covid/RSV negative   BC pending   PNA labs pending   Abx: IV azithromycin + IV ceftriaxone initiated in the ER. Continue

## 2025-03-05 NOTE — PLAN OF CARE
Problem: Potential for Falls  Goal: Patient will remain free of falls  Description: INTERVENTIONS:  - Educate patient/family on patient safety including physical limitations  - Instruct patient to call for assistance with activity   - Consult OT/PT to assist with strengthening/mobility   - Keep Call bell within reach  - Keep bed low and locked with side rails adjusted as appropriate  - Keep care items and personal belongings within reach  - Initiate and maintain comfort rounds  - Make Fall Risk Sign visible to staff  - Offer Toileting every 2 Hours, in advance of need  - Initiate/Maintain alarm  - Obtain necessary fall risk management equipment  - Apply yellow socks and bracelet for high fall risk patients  - Consider moving patient to room near nurses station  Outcome: Progressing     Problem: PAIN - ADULT  Goal: Verbalizes/displays adequate comfort level or baseline comfort level  Description: Interventions:  - Encourage patient to monitor pain and request assistance  - Assess pain using appropriate pain scale  - Administer analgesics based on type and severity of pain and evaluate response  - Implement non-pharmacological measures as appropriate and evaluate response  - Consider cultural and social influences on pain and pain management  - Notify physician/advanced practitioner if interventions unsuccessful or patient reports new pain  Outcome: Progressing     Problem: INFECTION - ADULT  Goal: Absence or prevention of progression during hospitalization  Description: INTERVENTIONS:  - Assess and monitor for signs and symptoms of infection  - Monitor lab/diagnostic results  - Monitor all insertion sites, i.e. indwelling lines, tubes, and drains  - Monitor endotracheal if appropriate and nasal secretions for changes in amount and color  - [place] appropriate cooling/warming therapies per order  - Administer medications as ordered  - Instruct and encourage patient and family to use good hand hygiene technique  -  Identify and instruct in appropriate isolation precautions for identified infection/condition  Outcome: Progressing  Goal: Absence of fever/infection during neutropenic period  Description: INTERVENTIONS:  - Monitor WBC    Outcome: Progressing     Problem: SAFETY ADULT  Goal: Patient will remain free of falls  Description: INTERVENTIONS:  - Educate patient/family on patient safety including physical limitations  - Instruct patient to call for assistance with activity   - Consult OT/PT to assist with strengthening/mobility   - Keep Call bell within reach  - Keep bed low and locked with side rails adjusted as appropriate  - Keep care items and personal belongings within reach  - Initiate and maintain comfort rounds  - Make Fall Risk Sign visible to staff  - Offer Toileting every 2 Hours, in advance of need  - Initiate/Maintainalarm  - Obtain necessary fall risk management equipment:   - Apply yellow socks and bracelet for high fall risk patients  - Consider moving patient to room near nurses station  Outcome: Progressing  Goal: Maintain or return to baseline ADL function  Description: INTERVENTIONS:  -  Assess patient's ability to carry out ADLs; assess patient's baseline for ADL function and identify physical deficits which impact ability to perform ADLs (bathing, care of mouth/teeth, toileting, grooming, dressing, etc.)  - Assess/evaluate cause of self-care deficits   - Assess range of motion  - Assess patient's mobility; develop plan if impaired  - Assess patient's need for assistive devices and provide as appropriate  - Encourage maximum independence but intervene and supervise when necessary  - Involve family in performance of ADLs  - Assess for home care needs following discharge   - Consider OT consult to assist with ADL evaluation and planning for discharge  - Provide patient education as appropriate  Outcome: Progressing  Goal: Maintains/Returns to pre admission functional level  Description:  INTERVENTIONS:  - Perform AM-PAC 6 Click Basic Mobility/ Daily Activity assessment daily.  - Set and communicate daily mobility goal to care team and patient/family/caregiver.   - Collaborate with rehabilitation services on mobility goals if consulted  - Perform Range of Motion 3 times a day.  - Reposition patient every 3 hours.  - Dangle patient 3 times a day  - Stand patient 3 times a day  - Ambulate patient 3 times a day  - Out of bed to chair 3 times a day   - Out of bed for meals 3 times a day  - Out of bed for toileting  - Record patient progress and toleration of activity level   Outcome: Progressing     Problem: DISCHARGE PLANNING  Goal: Discharge to home or other facility with appropriate resources  Description: INTERVENTIONS:  - Identify barriers to discharge w/patient and caregiver  - Arrange for needed discharge resources and transportation as appropriate  - Identify discharge learning needs (meds, wound care, etc.)  - Arrange for interpretive services to assist at discharge as needed  - Refer to Case Management Department for coordinating discharge planning if the patient needs post-hospital services based on physician/advanced practitioner order or complex needs related to functional status, cognitive ability, or social support system  Outcome: Progressing     Problem: Knowledge Deficit  Goal: Patient/family/caregiver demonstrates understanding of disease process, treatment plan, medications, and discharge instructions  Description: Complete learning assessment and assess knowledge base.  Interventions:  - Provide teaching at level of understanding  - Provide teaching via preferred learning methods  Outcome: Progressing     Problem: Nutrition/Hydration-ADULT  Goal: Nutrient/Hydration intake appropriate for improving, restoring or maintaining nutritional needs  Description: Monitor and assess patient's nutrition/hydration status for malnutrition. Collaborate with interdisciplinary team and initiate plan  and interventions as ordered.  Monitor patient's weight and dietary intake as ordered or per policy. Utilize nutrition screening tool and intervene as necessary. Determine patient's food preferences and provide high-protein, high-caloric foods as appropriate.     INTERVENTIONS:  - Monitor oral intake, urinary output, labs, and treatment plans  - Assess nutrition and hydration status and recommend course of action  - Evaluate amount of meals eaten  - Assist patient with eating if necessary   - Allow adequate time for meals  - Recommend/ encourage appropriate diets, oral nutritional supplements, and vitamin/mineral supplements  - Order, calculate, and assess calorie counts as needed  - Recommend, monitor, and adjust tube feedings and TPN/PPN based on assessed needs  - Assess need for intravenous fluids  - Provide specific nutrition/hydration education as appropriate  - Include patient/family/caregiver in decisions related to nutrition  Outcome: Progressing

## 2025-03-06 PROBLEM — R78.81 POSITIVE BLOOD CULTURE: Status: ACTIVE | Noted: 2025-03-06

## 2025-03-06 LAB
ANION GAP SERPL CALCULATED.3IONS-SCNC: 8 MMOL/L (ref 4–13)
BASOPHILS # BLD AUTO: 0.03 THOUSANDS/ÂΜL (ref 0–0.1)
BASOPHILS NFR BLD AUTO: 0 % (ref 0–1)
BUN SERPL-MCNC: 24 MG/DL (ref 5–25)
CALCIUM SERPL-MCNC: 9 MG/DL (ref 8.4–10.2)
CHLORIDE SERPL-SCNC: 102 MMOL/L (ref 96–108)
CO2 SERPL-SCNC: 29 MMOL/L (ref 21–32)
CREAT SERPL-MCNC: 0.5 MG/DL (ref 0.6–1.3)
EOSINOPHIL # BLD AUTO: 0.02 THOUSAND/ÂΜL (ref 0–0.61)
EOSINOPHIL NFR BLD AUTO: 0 % (ref 0–6)
ERYTHROCYTE [DISTWIDTH] IN BLOOD BY AUTOMATED COUNT: 15.3 % (ref 11.6–15.1)
GFR SERPL CREATININE-BSD FRML MDRD: 103 ML/MIN/1.73SQ M
GLUCOSE SERPL-MCNC: 110 MG/DL (ref 65–140)
HCT VFR BLD AUTO: 41.5 % (ref 34.8–46.1)
HGB BLD-MCNC: 13.5 G/DL (ref 11.5–15.4)
IMM GRANULOCYTES # BLD AUTO: 0.06 THOUSAND/UL (ref 0–0.2)
IMM GRANULOCYTES NFR BLD AUTO: 1 % (ref 0–2)
L PNEUMO1 AG UR QL IA.RAPID: NEGATIVE
LYMPHOCYTES # BLD AUTO: 1.35 THOUSANDS/ÂΜL (ref 0.6–4.47)
LYMPHOCYTES NFR BLD AUTO: 15 % (ref 14–44)
MCH RBC QN AUTO: 29.2 PG (ref 26.8–34.3)
MCHC RBC AUTO-ENTMCNC: 32.5 G/DL (ref 31.4–37.4)
MCV RBC AUTO: 90 FL (ref 82–98)
MONOCYTES # BLD AUTO: 0.6 THOUSAND/ÂΜL (ref 0.17–1.22)
MONOCYTES NFR BLD AUTO: 7 % (ref 4–12)
NEUTROPHILS # BLD AUTO: 6.86 THOUSANDS/ÂΜL (ref 1.85–7.62)
NEUTS SEG NFR BLD AUTO: 77 % (ref 43–75)
NRBC BLD AUTO-RTO: 0 /100 WBCS
PLATELET # BLD AUTO: 227 THOUSANDS/UL (ref 149–390)
PMV BLD AUTO: 9.7 FL (ref 8.9–12.7)
POTASSIUM SERPL-SCNC: 3.7 MMOL/L (ref 3.5–5.3)
RBC # BLD AUTO: 4.63 MILLION/UL (ref 3.81–5.12)
S PNEUM AG UR QL: NEGATIVE
SODIUM SERPL-SCNC: 139 MMOL/L (ref 135–147)
WBC # BLD AUTO: 8.92 THOUSAND/UL (ref 4.31–10.16)

## 2025-03-06 PROCEDURE — 85025 COMPLETE CBC W/AUTO DIFF WBC: CPT | Performed by: FAMILY MEDICINE

## 2025-03-06 PROCEDURE — 99233 SBSQ HOSP IP/OBS HIGH 50: CPT | Performed by: FAMILY MEDICINE

## 2025-03-06 PROCEDURE — 80048 BASIC METABOLIC PNL TOTAL CA: CPT | Performed by: FAMILY MEDICINE

## 2025-03-06 PROCEDURE — 94640 AIRWAY INHALATION TREATMENT: CPT

## 2025-03-06 PROCEDURE — 87040 BLOOD CULTURE FOR BACTERIA: CPT | Performed by: FAMILY MEDICINE

## 2025-03-06 PROCEDURE — 87449 NOS EACH ORGANISM AG IA: CPT | Performed by: INTERNAL MEDICINE

## 2025-03-06 PROCEDURE — 94760 N-INVAS EAR/PLS OXIMETRY 1: CPT

## 2025-03-06 RX ORDER — GUAIFENESIN 600 MG/1
600 TABLET, EXTENDED RELEASE ORAL EVERY 12 HOURS SCHEDULED
Status: DISCONTINUED | OUTPATIENT
Start: 2025-03-06 | End: 2025-03-10 | Stop reason: HOSPADM

## 2025-03-06 RX ADMIN — CEFTRIAXONE 1000 MG: 1 INJECTION, SOLUTION INTRAVENOUS at 17:50

## 2025-03-06 RX ADMIN — LEVOTHYROXINE SODIUM 125 MCG: 25 TABLET ORAL at 05:44

## 2025-03-06 RX ADMIN — HEPARIN SODIUM 5000 UNITS: 5000 INJECTION INTRAVENOUS; SUBCUTANEOUS at 05:44

## 2025-03-06 RX ADMIN — LEVALBUTEROL HYDROCHLORIDE 1.25 MG: 1.25 SOLUTION RESPIRATORY (INHALATION) at 13:56

## 2025-03-06 RX ADMIN — HEPARIN SODIUM 5000 UNITS: 5000 INJECTION INTRAVENOUS; SUBCUTANEOUS at 14:38

## 2025-03-06 RX ADMIN — HYDROCHLOROTHIAZIDE 25 MG: 25 TABLET ORAL at 10:22

## 2025-03-06 RX ADMIN — ATENOLOL 50 MG: 50 TABLET ORAL at 10:22

## 2025-03-06 RX ADMIN — IPRATROPIUM BROMIDE 0.5 MG: 0.5 SOLUTION RESPIRATORY (INHALATION) at 19:42

## 2025-03-06 RX ADMIN — LEVALBUTEROL HYDROCHLORIDE 1.25 MG: 1.25 SOLUTION RESPIRATORY (INHALATION) at 08:25

## 2025-03-06 RX ADMIN — GUAIFENESIN 600 MG: 600 TABLET ORAL at 21:50

## 2025-03-06 RX ADMIN — LEVALBUTEROL HYDROCHLORIDE 1.25 MG: 1.25 SOLUTION RESPIRATORY (INHALATION) at 19:42

## 2025-03-06 RX ADMIN — IPRATROPIUM BROMIDE 0.5 MG: 0.5 SOLUTION RESPIRATORY (INHALATION) at 08:25

## 2025-03-06 RX ADMIN — HEPARIN SODIUM 5000 UNITS: 5000 INJECTION INTRAVENOUS; SUBCUTANEOUS at 21:50

## 2025-03-06 RX ADMIN — GUAIFENESIN 600 MG: 600 TABLET ORAL at 14:38

## 2025-03-06 RX ADMIN — IPRATROPIUM BROMIDE 0.5 MG: 0.5 SOLUTION RESPIRATORY (INHALATION) at 13:56

## 2025-03-06 RX ADMIN — ATORVASTATIN CALCIUM 20 MG: 20 TABLET, FILM COATED ORAL at 17:49

## 2025-03-06 RX ADMIN — OSELTAMIVIR PHOSPHATE 75 MG: 75 CAPSULE ORAL at 21:50

## 2025-03-06 RX ADMIN — OSELTAMIVIR PHOSPHATE 75 MG: 75 CAPSULE ORAL at 10:22

## 2025-03-06 NOTE — ASSESSMENT & PLAN NOTE
Alphahemolytic Streptococcus, 1 of 2 sets -suspicion for skin contaminant  Continue ceftriaxone for now  Repeated set of blood cultures

## 2025-03-06 NOTE — PLAN OF CARE
Problem: Potential for Falls  Goal: Patient will remain free of falls  Description: INTERVENTIONS:  - Educate patient/family on patient safety including physical limitations  - Instruct patient to call for assistance with activity   - Consult OT/PT to assist with strengthening/mobility   - Keep Call bell within reach  - Keep bed low and locked with side rails adjusted as appropriate  - Keep care items and personal belongings within reach  - Initiate and maintain comfort rounds  - Make Fall Risk Sign visible to staff  - Offer Toileting every 2 Hours, in advance of need  - Initiate/Maintain alarm  - Obtain necessary fall risk management equipment  - Apply yellow socks and bracelet for high fall risk patients  - Consider moving patient to room near nurses station  Outcome: Progressing     Problem: PAIN - ADULT  Goal: Verbalizes/displays adequate comfort level or baseline comfort level  Description: Interventions:  - Encourage patient to monitor pain and request assistance  - Assess pain using appropriate pain scale  - Administer analgesics based on type and severity of pain and evaluate response  - Implement non-pharmacological measures as appropriate and evaluate response  - Consider cultural and social influences on pain and pain management  - Notify physician/advanced practitioner if interventions unsuccessful or patient reports new pain  Outcome: Progressing     Problem: INFECTION - ADULT  Goal: Absence or prevention of progression during hospitalization  Description: INTERVENTIONS:  - Assess and monitor for signs and symptoms of infection  - Monitor lab/diagnostic results  - Monitor all insertion sites, i.e. indwelling lines, tubes, and drains  - Monitor endotracheal if appropriate and nasal secretions for changes in amount and color  - [place] appropriate cooling/warming therapies per order  - Administer medications as ordered  - Instruct and encourage patient and family to use good hand hygiene technique  -  Identify and instruct in appropriate isolation precautions for identified infection/condition  Outcome: Progressing  Goal: Absence of fever/infection during neutropenic period  Description: INTERVENTIONS:  - Monitor WBC    Outcome: Progressing     Problem: SAFETY ADULT  Goal: Patient will remain free of falls  Description: INTERVENTIONS:  - Educate patient/family on patient safety including physical limitations  - Instruct patient to call for assistance with activity   - Consult OT/PT to assist with strengthening/mobility   - Keep Call bell within reach  - Keep bed low and locked with side rails adjusted as appropriate  - Keep care items and personal belongings within reach  - Initiate and maintain comfort rounds  - Make Fall Risk Sign visible to staff  - Offer Toileting every 2 Hours, in advance of need  - Initiate/Maintain alarm  - Obtain necessary fall risk management equipment  - Apply yellow socks and bracelet for high fall risk patients  - Consider moving patient to room near nurses station  Outcome: Progressing  Goal: Maintain or return to baseline ADL function  Description: INTERVENTIONS:  -  Assess patient's ability to carry out ADLs; assess patient's baseline for ADL function and identify physical deficits which impact ability to perform ADLs (bathing, care of mouth/teeth, toileting, grooming, dressing, etc.)  - Assess/evaluate cause of self-care deficits   - Assess range of motion  - Assess patient's mobility; develop plan if impaired  - Assess patient's need for assistive devices and provide as appropriate  - Encourage maximum independence but intervene and supervise when necessary  - Involve family in performance of ADLs  - Assess for home care needs following discharge   - Consider OT consult to assist with ADL evaluation and planning for discharge  - Provide patient education as appropriate  Outcome: Progressing  Goal: Maintains/Returns to pre admission functional level  Description: INTERVENTIONS:  -  Perform AM-PAC 6 Click Basic Mobility/ Daily Activity assessment daily.  - Set and communicate daily mobility goal to care team and patient/family/caregiver.   - Collaborate with rehabilitation services on mobility goals if consulted  - Perform Range of Motion 3 times a day.  - Reposition patient every 3 hours.  - Dangle patient 3 times a day  - Stand patient 3 times a day  - Ambulate patient 3 times a day  - Out of bed to chair 3 times a day   - Out of bed for meals 3 times a day  - Out of bed for toileting  - Record patient progress and toleration of activity level   Outcome: Progressing     Problem: DISCHARGE PLANNING  Goal: Discharge to home or other facility with appropriate resources  Description: INTERVENTIONS:  - Identify barriers to discharge w/patient and caregiver  - Arrange for needed discharge resources and transportation as appropriate  - Identify discharge learning needs (meds, wound care, etc.)  - Arrange for interpretive services to assist at discharge as needed  - Refer to Case Management Department for coordinating discharge planning if the patient needs post-hospital services based on physician/advanced practitioner order or complex needs related to functional status, cognitive ability, or social support system  Outcome: Progressing     Problem: Knowledge Deficit  Goal: Patient/family/caregiver demonstrates understanding of disease process, treatment plan, medications, and discharge instructions  Description: Complete learning assessment and assess knowledge base.  Interventions:  - Provide teaching at level of understanding  - Provide teaching via preferred learning methods  Outcome: Progressing     Problem: Nutrition/Hydration-ADULT  Goal: Nutrient/Hydration intake appropriate for improving, restoring or maintaining nutritional needs  Description: Monitor and assess patient's nutrition/hydration status for malnutrition. Collaborate with interdisciplinary team and initiate plan and interventions  as ordered.  Monitor patient's weight and dietary intake as ordered or per policy. Utilize nutrition screening tool and intervene as necessary. Determine patient's food preferences and provide high-protein, high-caloric foods as appropriate.     INTERVENTIONS:  - Monitor oral intake, urinary output, labs, and treatment plans  - Assess nutrition and hydration status and recommend course of action  - Evaluate amount of meals eaten  - Assist patient with eating if necessary   - Allow adequate time for meals  - Recommend/ encourage appropriate diets, oral nutritional supplements, and vitamin/mineral supplements  - Order, calculate, and assess calorie counts as needed  - Recommend, monitor, and adjust tube feedings and TPN/PPN based on assessed needs  - Assess need for intravenous fluids  - Provide specific nutrition/hydration education as appropriate  - Include patient/family/caregiver in decisions related to nutrition  Outcome: Progressing

## 2025-03-06 NOTE — PROGRESS NOTES
Progress Note - Hospitalist   Name: Joann Khanna 64 y.o. female I MRN: [id number]  Unit/Bed#: -01 I Date of Admission: 3/4/2025   Date of Service: 3/6/2025 I Hospital Day: 2    Assessment & Plan  Influenza A  Cough, congestion, SOB, generalized weakness over the past couple of days  88% on RA. Requiring 3 L NC   Weaned to room air wit O2 sats around 90 - monitor closely may need to place back on O2  Meeting SIRS on admission  Flu A +   Started on Tamiflu, continue  Also started on ceftriaxone with concern for superimposed bacterial infection, elevated procalcitonin  Now with a positive blood culture, however, suspect contaminant, repeat blood cx ordered  Manage symptoms   Neb treatments  Sepsis (HCC)  SIRS: Fever (100.6) + WBC 16.05   Procal: 0.49. continue to trend   Lactic WNL   Likely viral sepsis due to Influenza infection:   Possible superimposed bacterial infection  CT chest wo contrast   Diffuse bronchial wall thickening, suggestive of bronchitis or reactive airways disease.  Evaluation of lung parenchyma significantly limited by motion artifact, in particular in the lower lungs. Suggestion of some tree-in-bud nodularity in the right middle lobe, and some tubular opacities in the right lower lobe, likely infectious.  Mild mediastinal lymphadenopathy, likely reactive.  Covid/RSV negative   BC 1/2 sets with alphahemolytic strep, considerations for skin contaminant  PNA labs pending/procalcitonin x2 elevated -continue ceftriaxone, discontinue azithromycin  Repeat blood cultures ordered  Acute respiratory failure with hypoxia (HCC)  88% on RA   Requiring 3 L NC -now attempting to wean to room air, oxygen saturations around 90 currently  Suspect hypoxia in setting of Influenza A, bronchitis  Receiving tamiflu, IV ceftriaxone   Scheduled nebs   Respiratory protocol   Continue Solumedrol 40 mg IV BID  Will need outpatient PFTs  Home O2 eval prior to d/c  Hypokalemia  Resolved with  replacement  Hypertension  Home regimen:   HCTZ 25 mg daily   Atenolol 50 mg daily   Postoperative hypothyroidism  Continue levothyroxine   Mild hyperlipidemia  Continue statin   Nicotine dependence  Smoking cessation  Nicotine replacement  Positive blood culture  Alphahemolytic Streptococcus, 1 of 2 sets -suspicion for skin contaminant  Continue ceftriaxone for now  Repeated set of blood cultures    VTE Pharmacologic Prophylaxis: VTE Score: 3 Moderate Risk (Score 3-4) - Pharmacological DVT Prophylaxis Ordered: heparin.    Mobility:   Basic Mobility Inpatient Raw Score: 24  JH-HLM Goal: 8: Walk 250 feet or more  JH-HLM Achieved: 7: Walk 25 feet or more  JH-HLM Goal achieved. Continue to encourage appropriate mobility.    Patient Centered Rounds: I performed bedside rounds with nursing staff today.   Discussions with Specialists or Other Care Team Provider: Multidisciplinary meeting    Education and Discussions with Family / Patient:  patient.     Current Length of Stay: 2 day(s)  Current Patient Status: Inpatient   Certification Statement: The patient will continue to require additional inpatient hospital stay due to IV Rx, close monitoring  Discharge Plan: Anticipate discharge in 48 hrs to home.    Code Status: Level 1 - Full Code    Subjective   Patient reports feeling okay, still with a lot of cough.  No overnight events.    Objective :  Temp:  [97.3 °F (36.3 °C)-98.1 °F (36.7 °C)] 97.3 °F (36.3 °C)  HR:  [51-64] 58  BP: (126-158)/(59-71) 158/71  Resp:  [16-20] 20  SpO2:  [91 %-95 %] 91 %  O2 Device: None (Room air)  Nasal Cannula O2 Flow Rate (L/min):  [2 L/min-3 L/min] 2 L/min    There is no height or weight on file to calculate BMI.     Input and Output Summary (last 24 hours):     Intake/Output Summary (Last 24 hours) at 3/6/2025 1328  Last data filed at 3/6/2025 0749  Gross per 24 hour   Intake 780 ml   Output 500 ml   Net 280 ml       Physical Exam  Constitutional:       General: She is not in acute  distress.  HENT:      Head: Normocephalic and atraumatic.   Eyes:      Conjunctiva/sclera: Conjunctivae normal.   Cardiovascular:      Rate and Rhythm: Normal rate and regular rhythm.   Pulmonary:      Effort: No respiratory distress.      Breath sounds: Wheezing and rhonchi present. No rales.   Abdominal:      General: There is no distension.      Tenderness: There is no abdominal tenderness. There is no guarding.   Musculoskeletal:      Right lower leg: No edema.      Left lower leg: No edema.   Skin:     General: Skin is warm and dry.   Neurological:      Mental Status: She is oriented to person, place, and time.   Psychiatric:         Mood and Affect: Mood normal.           Lines/Drains:              Lab Results: I have reviewed the following results:   Results from last 7 days   Lab Units 03/06/25  0401 03/05/25 0445 03/04/25  1708   WBC Thousand/uL 8.92   < > 16.05*   HEMOGLOBIN g/dL 13.5   < > 15.4   HEMATOCRIT % 41.5   < > 47.1*   PLATELETS Thousands/uL 227   < > 247   BANDS PCT %  --   --  4   SEGS PCT % 77*   < >  --    LYMPHO PCT % 15   < > 11*   MONO PCT % 7   < > 16*   EOS PCT % 0   < > 0    < > = values in this interval not displayed.     Results from last 7 days   Lab Units 03/06/25  0401 03/05/25  0445   SODIUM mmol/L 139 138   POTASSIUM mmol/L 3.7 3.0*   CHLORIDE mmol/L 102 100   CO2 mmol/L 29 29   BUN mg/dL 24 35*   CREATININE mg/dL 0.50* 0.61   ANION GAP mmol/L 8 9   CALCIUM mg/dL 9.0 8.8   ALBUMIN g/dL  --  3.9   TOTAL BILIRUBIN mg/dL  --  0.64   ALK PHOS U/L  --  59   ALT U/L  --  24   AST U/L  --  44*   GLUCOSE RANDOM mg/dL 110 122     Results from last 7 days   Lab Units 03/04/25  1731   INR  1.06             Results from last 7 days   Lab Units 03/05/25  0445 03/04/25  1731   LACTIC ACID mmol/L  --  1.4   PROCALCITONIN ng/ml 0.51* 0.49*       Recent Cultures (last 7 days):   Results from last 7 days   Lab Units 03/04/25  1731   BLOOD CULTURE  Alpha Hemolytic Streptococcus*  No Growth at 24  hrs.   GRAM STAIN RESULT  Gram positive cocci in pairs and chains*             Last 24 Hours Medication List:     Current Facility-Administered Medications:     acetaminophen (TYLENOL) tablet 650 mg, Q6H PRN    albuterol (PROVENTIL HFA,VENTOLIN HFA) inhaler 2 puff, Q4H PRN    albuterol inhalation solution 2.5 mg, Q6H PRN    atenolol (TENORMIN) tablet 50 mg, Daily    atorvastatin (LIPITOR) tablet 20 mg, Daily With Dinner    azithromycin (ZITHROMAX) 500 mg in sodium chloride 0.9% 250mL IVPB 500 mg, Q24H, Last Rate: 500 mg (03/05/25 2028)    benzonatate (TESSALON PERLES) capsule 100 mg, TID PRN    cefTRIAXone (ROCEPHIN) IVPB (premix in dextrose) 1,000 mg 50 mL, Q24H, Last Rate: 1,000 mg (03/05/25 1714)    heparin (porcine) subcutaneous injection 5,000 Units, Q8H JERAD    hydroCHLOROthiazide tablet 25 mg, Daily    ipratropium (ATROVENT) 0.02 % inhalation solution 0.5 mg, TID    levalbuterol (XOPENEX) inhalation solution 1.25 mg, TID    levothyroxine tablet 125 mcg, Early Morning    ondansetron (ZOFRAN) injection 4 mg, Q6H PRN    oseltamivir (TAMIFLU) capsule 75 mg, Q12H [facility]    Administrative Statements   Today, Patient Was Seen By: Carla Amador MD  I have spent a total time of 52 minutes in caring for this patient on the day of the visit/encounter including Diagnostic results, Counseling / Coordination of care, Documenting in the medical record, Reviewing/placing orders in the medical record (including tests, medications, and/or procedures), and Communicating with other healthcare professionals .    **Please Note: This note may have been constructed using a voice recognition system.**

## 2025-03-06 NOTE — ASSESSMENT & PLAN NOTE
SIRS: Fever (100.6) + WBC 16.05   Procal: 0.49. continue to trend   Lactic WNL   Likely viral sepsis due to Influenza infection:   Possible superimposed bacterial infection  CT chest wo contrast   Diffuse bronchial wall thickening, suggestive of bronchitis or reactive airways disease.  Evaluation of lung parenchyma significantly limited by motion artifact, in particular in the lower lungs. Suggestion of some tree-in-bud nodularity in the right middle lobe, and some tubular opacities in the right lower lobe, likely infectious.  Mild mediastinal lymphadenopathy, likely reactive.  Covid/RSV negative   BC 1/2 sets with alphahemolytic strep, considerations for skin contaminant  PNA labs pending/procalcitonin x2 elevated -continue ceftriaxone, discontinue azithromycin  Repeat blood cultures ordered

## 2025-03-06 NOTE — UTILIZATION REVIEW
Continued Stay Review    Date: 3/6/25                          Current Patient Class: Inpatient  Current Level of Care: med surg    HPI:64 y.o. female initially admitted on 3/4/25 inpatient   Current Diagnosis:influenza A/Sepsis/acute respiratory failure with hypoxia/positive blood culture and suspect contaminant.     Assessment/Plan:     3/6/2025 .  Patient presents with persistent frequent cough  On exam lungs wheezing and rhonchi.  Abnormal labs or imaging:  wbc 9.92.  blood cultures alpha hemolytic strep  Diagnosis/Plan   influenza A/Sepsis/acute respiratory failure with hypoxia/positive blood culture and suspect contaminant.   to continue Tamiflu and ceftriaxone.  Stop azithromycin.    Weaned to room air - oximetry and  re apply oxygen as needed.  Repeat blood cultures. Scheduled nebs.   Continue IV steroids.  Start mucinex.      Medications:   Scheduled Medications:  atenolol, 50 mg, Oral, Daily  atorvastatin, 20 mg, Oral, Daily With Dinner  cefTRIAXone, 1,000 mg, Intravenous, Q24H  guaiFENesin, 600 mg, Oral, Q12H JERAD  heparin (porcine), 5,000 Units, Subcutaneous, Q8H JERAD  hydroCHLOROthiazide, 25 mg, Oral, Daily  ipratropium, 0.5 mg, Nebulization, TID  levalbuterol, 1.25 mg, Nebulization, TID  levothyroxine, 125 mcg, Oral, Early Morning  oseltamivir, 75 mg, Oral, Q12H JERAD    azithromycin (ZITHROMAX) 500 mg in sodium chloride 0.9% 250mL IVPB 500 mg  Dose: 500 mg  Freq: Every 24 hours Route: IV  Last Dose: 500 mg (03/05/25 2028)  Start: 03/05/25 2000 End: 03/06/25 1340    Continuous IV Infusions:     PRN Meds: not used.   acetaminophen, 650 mg, Oral, Q6H PRN  albuterol, 2 puff, Inhalation, Q4H PRN  albuterol, 2.5 mg, Nebulization, Q6H PRN  benzonatate, 100 mg, Oral, TID PRN  ondansetron, 4 mg, Intravenous, Q6H PRN      Discharge Plan:  to be determined.      Vital Signs (last 3 days)       Date/Time Temp Pulse Resp BP MAP (mmHg) SpO2 Calculated FIO2 (%) - Nasal Cannula O2 Flow Rate (L/min) Nasal Cannula O2 Flow  Rate (L/min) O2 Device Patient Position - Orthostatic VS Pain    03/06/25 1022 -- 58 -- 158/71 -- -- -- -- -- -- -- --    03/06/25 0959 -- -- -- -- -- 91 % -- -- -- None (Room air) -- --    03/06/25 0825 -- -- -- -- -- 95 % 28 -- 2 L/min Nasal cannula -- --    03/06/25 07:49:49 97.3 °F (36.3 °C) 51 20 135/59 84 95 % 28 -- 2 L/min Nasal cannula Sitting --    03/05/25 2251 98.1 °F (36.7 °C) 54 16 126/61 83 95 % -- -- -- -- -- --    03/05/25 2000 -- 60 18 -- -- 92 % 28 -- 2 L/min Nasal cannula -- --    03/05/25 1945 -- -- -- -- -- -- -- 2 L/min -- Nasal cannula -- No Pain    03/05/25 15:24:12 -- 64 -- 133/65 88 93 % -- -- -- -- -- --    03/05/25 1404 -- -- -- -- -- 95 % 32 -- 3 L/min Nasal cannula -- No Pain    03/05/25 07:21:47 -- 56 -- 118/58 78 98 % -- -- -- -- -- --    03/05/25 0716 -- -- -- -- -- -- 32 -- 3 L/min Nasal cannula -- --    03/05/25 0325 -- -- -- -- -- -- -- -- -- -- -- 5    03/04/25 2153 -- 62 24 -- -- 94 % -- -- -- -- -- --    03/04/25 2133 -- -- -- -- -- -- -- 3 L/min -- Nasal cannula -- No Pain    03/04/25 21:30:40 98.1 °F (36.7 °C) 64 -- 100/65 77 95 % -- -- -- -- -- --    03/04/25 2129 -- -- -- -- -- -- -- -- -- -- -- No Pain    03/04/25 2031 98.2 °F (36.8 °C) -- -- -- -- -- -- -- -- -- -- --    03/04/25 2030 -- 56 22 108/57 78 97 % -- -- -- -- -- --    03/04/25 2000 -- 57 20 104/53 75 95 % -- -- -- -- -- --    03/04/25 1952 -- -- -- -- -- -- -- 3 L/min -- Nasal cannula -- --    03/04/25 1930 -- 63 22 118/62 85 96 % -- -- -- -- -- --    03/04/25 1915 -- -- -- -- -- 96 % -- -- -- -- -- --    03/04/25 1900 -- 56 20 126/59 85 96 % 32 -- 3 L/min Nasal cannula -- --    03/04/25 1835 -- -- -- -- -- 88 %  -- -- -- None (Room air) -- --    03/04/25 1804 -- -- -- -- -- -- -- -- -- Nasal cannula -- --    03/04/25 1708 100.6 °F (38.1 °C) -- -- -- -- 92 % 28 -- 2 L/min Nasal cannula -- --    03/04/25 1706 -- -- -- 146/65 -- -- -- -- -- -- Lying --    03/04/25 1705 -- 82 20 -- -- 88 % -- -- -- None (Room air)  -- --          Weight (last 2 days)       None          Pertinent Labs/Diagnostic Results:   Radiology:  CT chest without contrast   Final Interpretation by Nayla Gillespie MD (03/04 2816)      1) Diffuse bronchial wall thickening, suggestive of bronchitis or reactive airways disease.      2) Evaluation of lung parenchyma significantly limited by motion artifact, in particular in the lower lungs. Suggestion of some tree-in-bud nodularity in the right middle lobe, and some tubular opacities in the right lower lobe, likely infectious.      3) Mild mediastinal lymphadenopathy, likely reactive.                           Workstation performed: [id number]         XR chest 2 views   Final Interpretation by Ry Peraza MD (03/05 0728)      Prominent bibasilar bronchial markings may represent bronchitis. See subsequent chest CT report.            Workstation performed: [id number]           Cardiology:  ECG 12 lead   Final Result by Onel Randhawa MD (03/05 1455)   Normal sinus rhythm   Nonspecific ST and T wave abnormality   Abnormal ECG   No previous ECGs available   Confirmed by Onel Randhawa ([id number]) on 3/5/2025 2:58:15 PM        GI:  No orders to display       Results from last 7 days   Lab Units 03/04/25  1708   SARS-COV-2  Negative     Results from last 7 days   Lab Units 03/06/25  0401 03/05/25  0445 03/04/25  1708   WBC Thousand/uL 8.92 13.12* 16.05*   HEMOGLOBIN g/dL 13.5 13.8 15.4   HEMATOCRIT % 41.5 43.0 47.1*   PLATELETS Thousands/uL 227 234 247   TOTAL NEUT ABS Thousands/µL 6.86 11.33*  --    BANDS PCT %  --   --  4     Results from last 7 days   Lab Units 03/06/25  0401 03/05/25  0445 03/04/25  1708   SODIUM mmol/L 139 138 137   POTASSIUM mmol/L 3.7 3.0* 3.2*   CHLORIDE mmol/L 102 100 98   CO2 mmol/L 29 29 29   ANION GAP mmol/L 8 9 10   BUN mg/dL 24 35* 32*   CREATININE mg/dL 0.50* 0.61 0.67   EGFR ml/min/1.73sq m 103 96 93   CALCIUM mg/dL 9.0 8.8 9.6   MAGNESIUM mg/dL  --  2.4  --       Results from last 7 days   Lab Units 03/05/25  0445 03/04/25  1708   AST U/L 44* 48*   ALT U/L 24 27   ALK PHOS U/L 59 71   TOTAL PROTEIN g/dL 7.1 7.7   ALBUMIN g/dL 3.9 4.4   TOTAL BILIRUBIN mg/dL 0.64 1.01*     Results from last 7 days   Lab Units 03/06/25  0401 03/05/25  0445 03/04/25  1708   GLUCOSE RANDOM mg/dL 110 122 125     Results from last 7 days   Lab Units 03/04/25  2031 03/04/25  1708   HS TNI 0HR ng/L  --  35   HS TNI 2HR ng/L 36  --    HSTNI D2 ng/L 1  --      Results from last 7 days   Lab Units 03/04/25  1731   PROTIME seconds 14.3   INR  1.06   PTT seconds 34     Results from last 7 days   Lab Units 03/05/25  0445 03/04/25  1731   PROCALCITONIN ng/ml 0.51* 0.49*     Results from last 7 days   Lab Units 03/04/25  1731   LACTIC ACID mmol/L 1.4     Results from last 7 days   Lab Units 03/04/25  1708   INFLUENZA A PCR  Positive*   INFLUENZA B PCR  Negative   RSV PCR  Negative       Results from last 7 days   Lab Units 03/04/25  1731   BLOOD CULTURE  Alpha Hemolytic Streptococcus*  No Growth at 24 hrs.   GRAM STAIN RESULT  Gram positive cocci in pairs and chains*         Network Utilization Review Department  ATTENTION: Please call with any questions or concerns to [phone] and carefully listen to the prompts so that you are directed to the right person. All voicemails are confidential.   For Discharge needs, contact Care Management DC Support Team at [phone] opt. [phone]  Send all requests for admission clinical reviews, approved or denied determinations and any other requests to dedicated fax number below belonging to the campus where the patient is receiving treatment. List of dedicated fax numbers for the Facilities:  FACILITY NAME UR FAX NUMBER   ADMISSION DENIALS (Administrative/Medical Necessity) [phone]   DISCHARGE SUPPORT TEAM (NETWORK) [phone]   PARENT CHILD HEALTH (Maternity/NICU/Pediatrics) [phone]   [facility] [phone]   [facility]  [facility] [phone]   [facility] [phone]   [facility] [phone]   [facility], [facility] [phone]   [facility] [phone]   [facility] [phone]   [facility] [phone]   [facility] [phone]   [facility] [phone]   [facility] [phone]   [facility] [phone]

## 2025-03-06 NOTE — ASSESSMENT & PLAN NOTE
88% on RA   Requiring 3 L NC -now attempting to wean to room air, oxygen saturations around 90 currently  Suspect hypoxia in setting of Influenza A, bronchitis  Receiving tamiflu, IV ceftriaxone   Scheduled nebs   Respiratory protocol   Continue Solumedrol 40 mg IV BID  Will need outpatient PFTs  Home O2 eval prior to d/c

## 2025-03-06 NOTE — QUICK NOTE
1 of 2 blood cultures positive for gram-positive cocci in pairs and chains.  Second set without growth at 24 hours.  Suspect contaminant.  Will not repeat at this time.

## 2025-03-06 NOTE — PLAN OF CARE
Problem: INFECTION - ADULT  Goal: Absence or prevention of progression during hospitalization  Description: INTERVENTIONS:  - Assess and monitor for signs and symptoms of infection  - Monitor lab/diagnostic results  - Monitor all insertion sites, i.e. indwelling lines, tubes, and drains  - Monitor endotracheal if appropriate and nasal secretions for changes in amount and color  - Parrott appropriate cooling/warming therapies per order  - Administer medications as ordered  - Instruct and encourage patient and family to use good hand hygiene technique  - Identify and instruct in appropriate isolation precautions for identified infection/condition  Outcome: Progressing  Goal: Absence of fever/infection during neutropenic period  Description: INTERVENTIONS:  - Monitor WBC    Outcome: Progressing     Problem: Nutrition/Hydration-ADULT  Goal: Nutrient/Hydration intake appropriate for improving, restoring or maintaining nutritional needs  Description: Monitor and assess patient's nutrition/hydration status for malnutrition. Collaborate with interdisciplinary team and initiate plan and interventions as ordered.  Monitor patient's weight and dietary intake as ordered or per policy. Utilize nutrition screening tool and intervene as necessary. Determine patient's food preferences and provide high-protein, high-caloric foods as appropriate.     INTERVENTIONS:  - Monitor oral intake, urinary output, labs, and treatment plans  - Assess nutrition and hydration status and recommend course of action  - Evaluate amount of meals eaten  - Assist patient with eating if necessary   - Allow adequate time for meals  - Recommend/ encourage appropriate diets, oral nutritional supplements, and vitamin/mineral supplements  - Order, calculate, and assess calorie counts as needed  - Recommend, monitor, and adjust tube feedings and TPN/PPN based on assessed needs  - Assess need for intravenous fluids  - Provide specific nutrition/hydration  education as appropriate  - Include patient/family/caregiver in decisions related to nutrition  Outcome: Progressing

## 2025-03-06 NOTE — ASSESSMENT & PLAN NOTE
Cough, congestion, SOB, generalized weakness over the past couple of days  88% on RA. Requiring 3 L NC   Weaned to room air wit O2 sats around 90 - monitor closely may need to place back on O2  Meeting SIRS on admission  Flu A +   Started on Tamiflu, continue  Also started on ceftriaxone with concern for superimposed bacterial infection, elevated procalcitonin  Now with a positive blood culture, however, suspect contaminant, repeat blood cx ordered  Manage symptoms   Neb treatments

## 2025-03-07 LAB
ANION GAP SERPL CALCULATED.3IONS-SCNC: 9 MMOL/L (ref 4–13)
BUN SERPL-MCNC: 18 MG/DL (ref 5–25)
CALCIUM SERPL-MCNC: 8.9 MG/DL (ref 8.4–10.2)
CHLORIDE SERPL-SCNC: 101 MMOL/L (ref 96–108)
CO2 SERPL-SCNC: 31 MMOL/L (ref 21–32)
CREAT SERPL-MCNC: 0.49 MG/DL (ref 0.6–1.3)
ERYTHROCYTE [DISTWIDTH] IN BLOOD BY AUTOMATED COUNT: 15.1 % (ref 11.6–15.1)
GFR SERPL CREATININE-BSD FRML MDRD: 104 ML/MIN/1.73SQ M
GLUCOSE SERPL-MCNC: 107 MG/DL (ref 65–140)
HCT VFR BLD AUTO: 39.8 % (ref 34.8–46.1)
HGB BLD-MCNC: 12.7 G/DL (ref 11.5–15.4)
MAGNESIUM SERPL-MCNC: 2.3 MG/DL (ref 1.9–2.7)
MCH RBC QN AUTO: 28.5 PG (ref 26.8–34.3)
MCHC RBC AUTO-ENTMCNC: 31.9 G/DL (ref 31.4–37.4)
MCV RBC AUTO: 89 FL (ref 82–98)
PLATELET # BLD AUTO: 246 THOUSANDS/UL (ref 149–390)
PMV BLD AUTO: 9.7 FL (ref 8.9–12.7)
POTASSIUM SERPL-SCNC: 3.4 MMOL/L (ref 3.5–5.3)
PROCALCITONIN SERPL-MCNC: 0.14 NG/ML
RBC # BLD AUTO: 4.46 MILLION/UL (ref 3.81–5.12)
SODIUM SERPL-SCNC: 141 MMOL/L (ref 135–147)
WBC # BLD AUTO: 6.32 THOUSAND/UL (ref 4.31–10.16)

## 2025-03-07 PROCEDURE — 99232 SBSQ HOSP IP/OBS MODERATE 35: CPT | Performed by: INTERNAL MEDICINE

## 2025-03-07 PROCEDURE — 85027 COMPLETE CBC AUTOMATED: CPT | Performed by: FAMILY MEDICINE

## 2025-03-07 PROCEDURE — 94640 AIRWAY INHALATION TREATMENT: CPT

## 2025-03-07 PROCEDURE — 94664 DEMO&/EVAL PT USE INHALER: CPT

## 2025-03-07 PROCEDURE — 94760 N-INVAS EAR/PLS OXIMETRY 1: CPT

## 2025-03-07 PROCEDURE — 83735 ASSAY OF MAGNESIUM: CPT | Performed by: FAMILY MEDICINE

## 2025-03-07 PROCEDURE — 84145 PROCALCITONIN (PCT): CPT | Performed by: FAMILY MEDICINE

## 2025-03-07 PROCEDURE — 80048 BASIC METABOLIC PNL TOTAL CA: CPT | Performed by: FAMILY MEDICINE

## 2025-03-07 RX ORDER — CEFTRIAXONE 2 G/50ML
2000 INJECTION, SOLUTION INTRAVENOUS EVERY 24 HOURS
Status: DISCONTINUED | OUTPATIENT
Start: 2025-03-07 | End: 2025-03-10 | Stop reason: HOSPADM

## 2025-03-07 RX ORDER — HYDROCODONE POLISTIREX AND CHLORPHENIRAMINE POLISTIREX 10; 8 MG/5ML; MG/5ML
5 SUSPENSION, EXTENDED RELEASE ORAL EVERY 12 HOURS PRN
Refills: 0 | Status: DISCONTINUED | OUTPATIENT
Start: 2025-03-07 | End: 2025-03-10 | Stop reason: HOSPADM

## 2025-03-07 RX ORDER — POTASSIUM CHLORIDE 1500 MG/1
40 TABLET, EXTENDED RELEASE ORAL ONCE
Status: COMPLETED | OUTPATIENT
Start: 2025-03-07 | End: 2025-03-07

## 2025-03-07 RX ADMIN — HEPARIN SODIUM 5000 UNITS: 5000 INJECTION INTRAVENOUS; SUBCUTANEOUS at 14:37

## 2025-03-07 RX ADMIN — ATORVASTATIN CALCIUM 20 MG: 20 TABLET, FILM COATED ORAL at 18:10

## 2025-03-07 RX ADMIN — HEPARIN SODIUM 5000 UNITS: 5000 INJECTION INTRAVENOUS; SUBCUTANEOUS at 05:20

## 2025-03-07 RX ADMIN — LEVALBUTEROL HYDROCHLORIDE 1.25 MG: 1.25 SOLUTION RESPIRATORY (INHALATION) at 13:50

## 2025-03-07 RX ADMIN — IPRATROPIUM BROMIDE 0.5 MG: 0.5 SOLUTION RESPIRATORY (INHALATION) at 07:09

## 2025-03-07 RX ADMIN — IPRATROPIUM BROMIDE 0.5 MG: 0.5 SOLUTION RESPIRATORY (INHALATION) at 19:11

## 2025-03-07 RX ADMIN — HYDROCODONE POLISTIREX AND CHLORPHENIRAMINE POLISTIREX 5 ML: 10; 8 SUSPENSION, EXTENDED RELEASE ORAL at 14:37

## 2025-03-07 RX ADMIN — GUAIFENESIN 600 MG: 600 TABLET ORAL at 22:40

## 2025-03-07 RX ADMIN — LEVALBUTEROL HYDROCHLORIDE 1.25 MG: 1.25 SOLUTION RESPIRATORY (INHALATION) at 07:09

## 2025-03-07 RX ADMIN — GUAIFENESIN 600 MG: 600 TABLET ORAL at 08:38

## 2025-03-07 RX ADMIN — ATENOLOL 50 MG: 50 TABLET ORAL at 08:38

## 2025-03-07 RX ADMIN — OSELTAMIVIR PHOSPHATE 75 MG: 75 CAPSULE ORAL at 22:40

## 2025-03-07 RX ADMIN — LEVOTHYROXINE SODIUM 125 MCG: 25 TABLET ORAL at 05:20

## 2025-03-07 RX ADMIN — HYDROCHLOROTHIAZIDE 25 MG: 25 TABLET ORAL at 08:38

## 2025-03-07 RX ADMIN — OSELTAMIVIR PHOSPHATE 75 MG: 75 CAPSULE ORAL at 08:38

## 2025-03-07 RX ADMIN — LEVALBUTEROL HYDROCHLORIDE 1.25 MG: 1.25 SOLUTION RESPIRATORY (INHALATION) at 19:11

## 2025-03-07 RX ADMIN — HEPARIN SODIUM 5000 UNITS: 5000 INJECTION INTRAVENOUS; SUBCUTANEOUS at 22:40

## 2025-03-07 RX ADMIN — POTASSIUM CHLORIDE 40 MEQ: 1500 TABLET, EXTENDED RELEASE ORAL at 08:38

## 2025-03-07 RX ADMIN — CEFTRIAXONE 2000 MG: 2 INJECTION, SOLUTION INTRAVENOUS at 18:09

## 2025-03-07 RX ADMIN — IPRATROPIUM BROMIDE 0.5 MG: 0.5 SOLUTION RESPIRATORY (INHALATION) at 13:51

## 2025-03-07 NOTE — ASSESSMENT & PLAN NOTE
88% on RA   Requiring 3 L NC -now attempting to wean to room air, oxygen saturations around 90 currently  Suspect hypoxia in setting of Influenza A, bronchitis  Receiving tamiflu, IV ceftriaxone   Scheduled nebs   Respiratory protocol   Will need outpatient PFTs  Home O2 eval prior to d/c

## 2025-03-07 NOTE — PROGRESS NOTES
Progress Note - Hospitalist   Name: Joann Khanna 64 y.o. female I MRN: [id number]  Unit/Bed#: -01 I Date of Admission: 3/4/2025   Date of Service: 3/7/2025 I Hospital Day: 3    Assessment & Plan  Influenza A  Cough, congestion, SOB, generalized weakness over the past couple of days  88% on RA. Requiring 3 L NC   Weaned to room air wit O2 sats around 90 - monitor closely may need to place back on O2  Meeting SIRS on admission  Flu A +   Started on Tamiflu, continue  Also started on ceftriaxone with concern for superimposed bacterial infection, elevated procalcitonin  Now with a positive blood culture, however, suspect contaminant, repeat blood cx ordered.  Follow-up on repeat blood culture results.  Continue with ceftriaxone in the interim.  Dose increased to 2 g daily per patient weight.  Manage symptoms   Neb treatments  Sepsis (HCC)  SIRS: Fever (100.6) + WBC 16.05   Procal: 0.49. continue to trend   Lactic WNL   Likely viral sepsis due to Influenza infection:   Possible superimposed bacterial infection  CT chest wo contrast   Diffuse bronchial wall thickening, suggestive of bronchitis or reactive airways disease.  Evaluation of lung parenchyma significantly limited by motion artifact, in particular in the lower lungs. Suggestion of some tree-in-bud nodularity in the right middle lobe, and some tubular opacities in the right lower lobe, likely infectious.  Mild mediastinal lymphadenopathy, likely reactive.  Covid/RSV negative   BC 1/2 sets with alphahemolytic strep, Streptococcus para sanguinous, considerations for skin contaminant.  Await repeat culture results  Procalcitonin x2 elevated -continue ceftriaxone, discontinue azithromycin  Repeat blood cultures pending  Acute respiratory failure with hypoxia (HCC)  88% on RA   Requiring 3 L NC -now attempting to wean to room air, oxygen saturations around 90 currently  Suspect hypoxia in setting of Influenza A, bronchitis  Receiving tamiflu, IV ceftriaxone    Scheduled nebs   Respiratory protocol   Will need outpatient PFTs  Home O2 eval prior to d/c  Hypokalemia  Potassium at 3.4 today.  Will replace  Hypertension  Home regimen:   HCTZ 25 mg daily   Atenolol 50 mg daily   Postoperative hypothyroidism  Continue levothyroxine   Mild hyperlipidemia  Continue statin   Nicotine dependence  Smoking cessation  Nicotine replacement  Positive blood culture  Alphahemolytic Streptococcus, 1 of 2 sets -suspicion for skin contaminant  Continue ceftriaxone for now  Repeated set of blood cultures pending    VTE Pharmacologic Prophylaxis: VTE Score: 3 High Risk (Score >/= 5) - Pharmacological DVT Prophylaxis Ordered: heparin. Sequential Compression Devices Ordered.    Mobility:   Basic Mobility Inpatient Raw Score: 23  JH-HLM Goal: 7: Walk 25 feet or more  JH-HLM Achieved: 7: Walk 25 feet or more  JH-HLM Goal achieved. Continue to encourage appropriate mobility.    Patient Centered Rounds: I performed bedside rounds with nursing staff today.   Discussions with Specialists or Other Care Team Provider: Discussed with nursing    Education and Discussions with Family / Patient: Patient declined call to .     Current Length of Stay: 3 day(s)  Current Patient Status: Inpatient   Certification Statement: The patient will continue to require additional inpatient hospital stay due to await final blood culture result  Discharge Plan: Anticipate discharge tomorrow to home with home services.    Code Status: Level 1 - Full Code    Subjective   Patient seen and examined at bedside.  Reports breathing is improved.  Complains of cough.  Currently on 2 L supplemental oxygen secondary to hypoxia noted overnight.    Objective :  Temp:  [97.6 °F (36.4 °C)-98.1 °F (36.7 °C)] 97.9 °F (36.6 °C)  HR:  [51-55] 55  BP: (114-130)/(55-60) 130/60  Resp:  [20] 20  SpO2:  [89 %-93 %] 92 %  O2 Device: Nasal cannula  Nasal Cannula O2 Flow Rate (L/min):  [2 L/min] 2 L/min    There is no height or  weight on file to calculate BMI.     Input and Output Summary (last 24 hours):     Intake/Output Summary (Last 24 hours) at 3/7/2025 1104  Last data filed at 3/7/2025 0523  Gross per 24 hour   Intake 240 ml   Output 200 ml   Net 40 ml       Physical Exam  Constitutional:       Appearance: She is obese.   HENT:      Right Ear: Tympanic membrane normal.      Nose: Nose normal.   Eyes:      Extraocular Movements: Extraocular movements intact.      Pupils: Pupils are equal, round, and reactive to light.   Pulmonary:      Comments: Scattered expiratory wheezing.  No tachypnea or increased work of breathing.  Abdominal:      General: Abdomen is flat. Bowel sounds are normal.      Palpations: Abdomen is soft.   Musculoskeletal:      Right lower leg: No edema.      Left lower leg: No edema.   Skin:     General: Skin is warm.   Neurological:      General: No focal deficit present.      Mental Status: She is alert and oriented to person, place, and time. Mental status is at baseline.           Lines/Drains:              Lab Results: I have reviewed the following results:   Results from last 7 days   Lab Units 03/07/25 0451 03/06/25 0401 03/05/25 0445 03/04/25  1708   WBC Thousand/uL 6.32 8.92   < > 16.05*   HEMOGLOBIN g/dL 12.7 13.5   < > 15.4   HEMATOCRIT % 39.8 41.5   < > 47.1*   PLATELETS Thousands/uL 246 227   < > 247   BANDS PCT %  --   --   --  4   SEGS PCT %  --  77*   < >  --    LYMPHO PCT %  --  15   < > 11*   MONO PCT %  --  7   < > 16*   EOS PCT %  --  0   < > 0    < > = values in this interval not displayed.     Results from last 7 days   Lab Units 03/07/25 0451 03/06/25 0401 03/05/25  0445   SODIUM mmol/L 141   < > 138   POTASSIUM mmol/L 3.4*   < > 3.0*   CHLORIDE mmol/L 101   < > 100   CO2 mmol/L 31   < > 29   BUN mg/dL 18   < > 35*   CREATININE mg/dL 0.49*   < > 0.61   ANION GAP mmol/L 9   < > 9   CALCIUM mg/dL 8.9   < > 8.8   ALBUMIN g/dL  --   --  3.9   TOTAL BILIRUBIN mg/dL  --   --  0.64   ALK PHOS U/L   --   --  59   ALT U/L  --   --  24   AST U/L  --   --  44*   GLUCOSE RANDOM mg/dL 107   < > 122    < > = values in this interval not displayed.     Results from last 7 days   Lab Units 03/04/25  1731   INR  1.06             Results from last 7 days   Lab Units 03/07/25  0451 03/05/25  0445 03/04/25  1731   LACTIC ACID mmol/L  --   --  1.4   PROCALCITONIN ng/ml 0.14 0.51* 0.49*       Recent Cultures (last 7 days):   Results from last 7 days   Lab Units 03/06/25  1650 03/06/25  1453 03/04/25  1731   BLOOD CULTURE   --  Received in Microbiology Lab. Culture in Progress.  Received in Microbiology Lab. Culture in Progress. No Growth at 48 hrs.  Streptococcus parasanguinis*   GRAM STAIN RESULT   --   --  Gram positive cocci in pairs and chains*   LEGIONELLA URINARY ANTIGEN  Negative  --   --              Last 24 Hours Medication List:     Current Facility-Administered Medications:     acetaminophen (TYLENOL) tablet 650 mg, Q6H PRN    albuterol (PROVENTIL HFA,VENTOLIN HFA) inhaler 2 puff, Q4H PRN    albuterol inhalation solution 2.5 mg, Q6H PRN    atenolol (TENORMIN) tablet 50 mg, Daily    atorvastatin (LIPITOR) tablet 20 mg, Daily With Dinner    cefTRIAXone (ROCEPHIN) IVPB (premix in dextrose) 2,000 mg 50 mL, Q24H    guaiFENesin (MUCINEX) 12 hr tablet 600 mg, Q12H JERAD    heparin (porcine) subcutaneous injection 5,000 Units, Q8H JERAD    hydroCHLOROthiazide tablet 25 mg, Daily    Hydrocod Sharath-Chlorphe Sharath ER (TUSSIONEX) ER suspension 5 mL, Q12H PRN    ipratropium (ATROVENT) 0.02 % inhalation solution 0.5 mg, TID    levalbuterol (XOPENEX) inhalation solution 1.25 mg, TID    levothyroxine tablet 125 mcg, Early Morning    ondansetron (ZOFRAN) injection 4 mg, Q6H PRN    oseltamivir (TAMIFLU) capsule 75 mg, Q12H [facility]    Administrative Statements   Today, Patient Was Seen By: Mandy Guzman MD      **Please Note: This note may have been constructed using a voice recognition system.**

## 2025-03-07 NOTE — ASSESSMENT & PLAN NOTE
SIRS: Fever (100.6) + WBC 16.05   Procal: 0.49. continue to trend   Lactic WNL   Likely viral sepsis due to Influenza infection:   Possible superimposed bacterial infection  CT chest wo contrast   Diffuse bronchial wall thickening, suggestive of bronchitis or reactive airways disease.  Evaluation of lung parenchyma significantly limited by motion artifact, in particular in the lower lungs. Suggestion of some tree-in-bud nodularity in the right middle lobe, and some tubular opacities in the right lower lobe, likely infectious.  Mild mediastinal lymphadenopathy, likely reactive.  Covid/RSV negative   BC 1/2 sets with alphahemolytic strep, Streptococcus para sanguinous, considerations for skin contaminant.  Await repeat culture results  Procalcitonin x2 elevated -continue ceftriaxone, discontinue azithromycin  Repeat blood cultures pending

## 2025-03-07 NOTE — ASSESSMENT & PLAN NOTE
Alphahemolytic Streptococcus, 1 of 2 sets -suspicion for skin contaminant  Continue ceftriaxone for now  Repeated set of blood cultures pending

## 2025-03-07 NOTE — ASSESSMENT & PLAN NOTE
Cough, congestion, SOB, generalized weakness over the past couple of days  88% on RA. Requiring 3 L NC   Weaned to room air wit O2 sats around 90 - monitor closely may need to place back on O2  Meeting SIRS on admission  Flu A +   Started on Tamiflu, continue  Also started on ceftriaxone with concern for superimposed bacterial infection, elevated procalcitonin  Now with a positive blood culture, however, suspect contaminant, repeat blood cx ordered.  Follow-up on repeat blood culture results.  Continue with ceftriaxone in the interim.  Dose increased to 2 g daily per patient weight.  Manage symptoms   Neb treatments

## 2025-03-07 NOTE — UTILIZATION REVIEW
Continued Stay Review    Date: 3/7/25                          Current Patient Class: Inpatient  Current Level of Care: med surg    HPI:64 y.o. female initially admitted on 3/4/25 inpatient      Current Diagnosis:Influenza A/Sepsis/acute respiratory failure with hypoxia/Hypokalemia/positive blood culture    Assessment/Plan:     3/7/2025 .  Patient presents with continued cough.  Breathing improved.  Overnight hypoxia and placed on oxygen 2 liters.   On exam:  obese.  Scattered expiratory wheeze.   Abnormal labs or imaging:  blood cultures 1/2 with alpha hemolytic strep, strep para sanguinous, consider skin contaminant,  repeat blood cultures pending.  K 3.4.     Plan     to continue Tamiflu.  Continue ceftriaxone and dose increased. Follow cultures.  Scheduled nebs.  Oximetry, wean oxygen as able. .   Replete K.        Medications:   Scheduled Medications:  atenolol, 50 mg, Oral, Daily  atorvastatin, 20 mg, Oral, Daily With Dinner  cefTRIAXone, 2,000 mg, Intravenous, Q24H  guaiFENesin, 600 mg, Oral, Q12H JERAD  heparin (porcine), 5,000 Units, Subcutaneous, Q8H JERAD  hydroCHLOROthiazide, 25 mg, Oral, Daily  ipratropium, 0.5 mg, Nebulization, TID  levalbuterol, 1.25 mg, Nebulization, TID  levothyroxine, 125 mcg, Oral, Early Morning  oseltamivir, 75 mg, Oral, Q12H JERAD    cefTRIAXone (ROCEPHIN) IVPB (premix in dextrose) 1,000 mg 50 mL  Dose: 1,000 mg  Freq: Every 24 hours Route: IV  Indications of Use: PNEUMONIA  Last Dose: 1,000 mg (03/06/25 1750)  Start: 03/05/25 1800 End: 03/07/25 0750  potassium chloride (Klor-Con M20) CR tablet 40 mEq  Dose: 40 mEq  Freq: Once Route: PO  Start: 03/07/25 0800 End: 03/07/25 0838    Continuous IV Infusions:     PRN Meds: not used.   acetaminophen, 650 mg, Oral, Q6H PRN  albuterol, 2 puff, Inhalation, Q4H PRN  albuterol, 2.5 mg, Nebulization, Q6H PRN  Hydrocod Sharath-Chlorphe Sharath ER, 5 mL, Oral, Q12H PRN  ondansetron, 4 mg, Intravenous, Q6H PRN      Discharge Plan:  to be determined.      Vital Signs (last 3 days)       Date/Time Temp Pulse Resp BP MAP (mmHg) SpO2 Calculated FIO2 (%) - Nasal Cannula O2 Flow Rate (L/min) Nasal Cannula O2 Flow Rate (L/min) O2 Device Patient Position - Orthostatic VS Pain    03/07/25 08:02:34 97.9 °F (36.6 °C) 55 -- 130/60 83 92 % -- -- -- -- -- --    03/07/25 0710 -- -- -- -- -- 93 % 28 -- 2 L/min Nasal cannula -- --    03/06/25 22:45:55 98.1 °F (36.7 °C) 55 -- 123/55 78 93 % -- -- -- -- -- --    03/06/25 2150 -- -- -- -- -- -- -- -- -- None (Room air) -- No Pain    03/06/25 1942 -- -- -- -- -- -- -- -- -- None (Room air) -- --    03/06/25 14:57:34 97.6 °F (36.4 °C) 51 20 114/55 75 89 % -- -- -- None (Room air) Lying --    03/06/25 1357 -- -- -- -- -- 91 % -- -- -- None (Room air) -- --    03/06/25 1022 -- 58 -- 158/71 -- -- -- -- -- -- -- --    03/06/25 1015 -- -- -- -- -- -- -- -- -- -- -- No Pain    03/06/25 0959 -- -- -- -- -- 91 % -- -- -- None (Room air) -- --    03/06/25 0825 -- -- -- -- -- 95 % 28 -- 2 L/min Nasal cannula -- --    03/06/25 07:49:49 97.3 °F (36.3 °C) 51 20 135/59 84 95 % 28 -- 2 L/min Nasal cannula Sitting --    03/05/25 2251 98.1 °F (36.7 °C) 54 16 126/61 83 95 % -- -- -- -- -- --    03/05/25 2000 -- 60 18 -- -- 92 % 28 -- 2 L/min Nasal cannula -- --    03/05/25 1945 -- -- -- -- -- -- -- 2 L/min -- Nasal cannula -- No Pain    03/05/25 15:24:12 -- 64 -- 133/65 88 93 % -- -- -- -- -- --    03/05/25 1404 -- -- -- -- -- 95 % 32 -- 3 L/min Nasal cannula -- No Pain    03/05/25 07:21:47 -- 56 -- 118/58 78 98 % -- -- -- -- -- --    03/05/25 0716 -- -- -- -- -- -- 32 -- 3 L/min Nasal cannula -- --    03/05/25 0325 -- -- -- -- -- -- -- -- -- -- -- 5    03/04/25 2153 -- 62 24 -- -- 94 % -- -- -- -- -- --    03/04/25 2133 -- -- -- -- -- -- -- 3 L/min -- Nasal cannula -- No Pain    03/04/25 21:30:40 98.1 °F (36.7 °C) 64 -- 100/65 77 95 % -- -- -- -- -- --    03/04/25 2129 -- -- -- -- -- -- -- -- -- -- -- No Pain    03/04/25 2031 98.2 °F (36.8 °C) -- --  -- -- -- -- -- -- -- -- --    03/04/25 2030 -- 56 22 108/57 78 97 % -- -- -- -- -- --    03/04/25 2000 -- 57 20 104/53 75 95 % -- -- -- -- -- --    03/04/25 1952 -- -- -- -- -- -- -- 3 L/min -- Nasal cannula -- --    03/04/25 1930 -- 63 22 118/62 85 96 % -- -- -- -- -- --    03/04/25 1915 -- -- -- -- -- 96 % -- -- -- -- -- --    03/04/25 1900 -- 56 20 126/59 85 96 % 32 -- 3 L/min Nasal cannula -- --    03/04/25 1835 -- -- -- -- -- 88 %  -- -- -- None (Room air) -- --    03/04/25 1804 -- -- -- -- -- -- -- -- -- Nasal cannula -- --    03/04/25 1708 100.6 °F (38.1 °C) -- -- -- -- 92 % 28 -- 2 L/min Nasal cannula -- --    03/04/25 1706 -- -- -- 146/65 -- -- -- -- -- -- Lying --    03/04/25 1705 -- 82 20 -- -- 88 % -- -- -- None (Room air) -- --          Weight (last 2 days)       None          Pertinent Labs/Diagnostic Results:   Radiology:  CT chest without contrast   Final Interpretation by Nayla Gillespie MD (03/04 1826)      1) Diffuse bronchial wall thickening, suggestive of bronchitis or reactive airways disease.      2) Evaluation of lung parenchyma significantly limited by motion artifact, in particular in the lower lungs. Suggestion of some tree-in-bud nodularity in the right middle lobe, and some tubular opacities in the right lower lobe, likely infectious.      3) Mild mediastinal lymphadenopathy, likely reactive.                           Workstation performed: [id number]         XR chest 2 views   Final Interpretation by Ry Peraza MD (03/05 0728)      Prominent bibasilar bronchial markings may represent bronchitis. See subsequent chest CT report.            Workstation performed: [id number]           Cardiology:  ECG 12 lead   Final Result by Onel Randhawa MD (03/05 7348)   Normal sinus rhythm   Nonspecific ST and T wave abnormality   Abnormal ECG   No previous ECGs available   Confirmed by Onel Randhawa ([id number]) on 3/5/2025 2:58:15 PM        GI:  No orders to display       Results  from last 7 days   Lab Units 03/04/25  1708   SARS-COV-2  Negative     Results from last 7 days   Lab Units 03/07/25  0451 03/06/25  0401 03/05/25 0445 03/04/25  1708   WBC Thousand/uL 6.32 8.92 13.12* 16.05*   HEMOGLOBIN g/dL 12.7 13.5 13.8 15.4   HEMATOCRIT % 39.8 41.5 43.0 47.1*   PLATELETS Thousands/uL 246 227 234 247   TOTAL NEUT ABS Thousands/µL  --  6.86 11.33*  --    BANDS PCT %  --   --   --  4         Results from last 7 days   Lab Units 03/07/25  0451 03/06/25  0401 03/05/25 0445 03/04/25  1708   SODIUM mmol/L 141 139 138 137   POTASSIUM mmol/L 3.4* 3.7 3.0* 3.2*   CHLORIDE mmol/L 101 102 100 98   CO2 mmol/L 31 29 29 29   ANION GAP mmol/L 9 8 9 10   BUN mg/dL 18 24 35* 32*   CREATININE mg/dL 0.49* 0.50* 0.61 0.67   EGFR ml/min/1.73sq m 104 103 96 93   CALCIUM mg/dL 8.9 9.0 8.8 9.6   MAGNESIUM mg/dL 2.3  --  2.4  --      Results from last 7 days   Lab Units 03/05/25 0445 03/04/25  1708   AST U/L 44* 48*   ALT U/L 24 27   ALK PHOS U/L 59 71   TOTAL PROTEIN g/dL 7.1 7.7   ALBUMIN g/dL 3.9 4.4   TOTAL BILIRUBIN mg/dL 0.64 1.01*     Results from last 7 days   Lab Units 03/07/25  0451 03/06/25  0401 03/05/25 0445 03/04/25  1708   GLUCOSE RANDOM mg/dL 107 110 122 125     Results from last 7 days   Lab Units 03/04/25 2031 03/04/25  1708   HS TNI 0HR ng/L  --  35   HS TNI 2HR ng/L 36  --    HSTNI D2 ng/L 1  --      Results from last 7 days   Lab Units 03/04/25  1731   PROTIME seconds 14.3   INR  1.06   PTT seconds 34     Results from last 7 days   Lab Units 03/07/25  0451 03/05/25  0445 03/04/25  1731   PROCALCITONIN ng/ml 0.14 0.51* 0.49*     Results from last 7 days   Lab Units 03/04/25  1731   LACTIC ACID mmol/L 1.4     Results from last 7 days   Lab Units 03/06/25  1650 03/04/25  1708   STREP PNEUMONIAE ANTIGEN, URINE  Negative  --    LEGIONELLA URINARY ANTIGEN  Negative  --    INFLUENZA A PCR   --  Positive*   INFLUENZA B PCR   --  Negative   RSV PCR   --  Negative     Results from last 7 days   Lab  Units 03/06/25  1453 03/04/25  1731   BLOOD CULTURE  Received in Microbiology Lab. Culture in Progress.  Received in Microbiology Lab. Culture in Progress. No Growth at 48 hrs.  Streptococcus parasanguinis*   GRAM STAIN RESULT   --  Gram positive cocci in pairs and chains*         Network Utilization Review Department  ATTENTION: Please call with any questions or concerns to [phone] and carefully listen to the prompts so that you are directed to the right person. All voicemails are confidential.   For Discharge needs, contact Care Management DC Support Team at [phone] opt. 2  Send all requests for admission clinical reviews, approved or denied determinations and any other requests to dedicated fax number below belonging to the campus where the patient is receiving treatment. List of dedicated fax numbers for the Facilities:  FACILITY NAME UR FAX NUMBER   ADMISSION DENIALS (Administrative/Medical Necessity) [phone]   DISCHARGE SUPPORT TEAM (NETWORK) [phone]   PARENT CHILD HEALTH (Maternity/NICU/Pediatrics) [phone]   [facility] [phone]   [facility] [phone]   [facility] [phone]   [facility] [phone]   [facility] [phone]   [facility] [phone]   [facility] [phone]   [facility] [phone]   [facility] [phone]   [facility] [phone]   [facility] [phone]   [facility] [phone]

## 2025-03-07 NOTE — PLAN OF CARE
Problem: Potential for Falls  Goal: Patient will remain free of falls  Description: INTERVENTIONS:  - Educate patient/family on patient safety including physical limitations  - Instruct patient to call for assistance with activity   - Consult OT/PT to assist with strengthening/mobility   - Keep Call bell within reach  - Keep bed low and locked with side rails adjusted as appropriate  - Keep care items and personal belongings within reach  - Initiate and maintain comfort rounds  - Make Fall Risk Sign visible to staff  - Offer Toileting every 2 Hours, in advance of need  - Initiate/Maintain alarm  - Obtain necessary fall risk management equipment  - Apply yellow socks and bracelet for high fall risk patients  - Consider moving patient to room near nurses station  Outcome: Progressing     Problem: PAIN - ADULT  Goal: Verbalizes/displays adequate comfort level or baseline comfort level  Description: Interventions:  - Encourage patient to monitor pain and request assistance  - Assess pain using appropriate pain scale  - Administer analgesics based on type and severity of pain and evaluate response  - Implement non-pharmacological measures as appropriate and evaluate response  - Consider cultural and social influences on pain and pain management  - Notify physician/advanced practitioner if interventions unsuccessful or patient reports new pain  Outcome: Progressing     Problem: INFECTION - ADULT  Goal: Absence or prevention of progression during hospitalization  Description: INTERVENTIONS:  - Assess and monitor for signs and symptoms of infection  - Monitor lab/diagnostic results  - Monitor all insertion sites, i.e. indwelling lines, tubes, and drains  - Monitor endotracheal if appropriate and nasal secretions for changes in amount and color  - [place] appropriate cooling/warming therapies per order  - Administer medications as ordered  - Instruct and encourage patient and family to use good hand hygiene technique  -  Identify and instruct in appropriate isolation precautions for identified infection/condition  Outcome: Progressing  Goal: Absence of fever/infection during neutropenic period  Description: INTERVENTIONS:  - Monitor WBC    Outcome: Progressing     Problem: SAFETY ADULT  Goal: Patient will remain free of falls  Description: INTERVENTIONS:  - Educate patient/family on patient safety including physical limitations  - Instruct patient to call for assistance with activity   - Consult OT/PT to assist with strengthening/mobility   - Keep Call bell within reach  - Keep bed low and locked with side rails adjusted as appropriate  - Keep care items and personal belongings within reach  - Initiate and maintain comfort rounds  - Make Fall Risk Sign visible to staff  - Offer Toileting every 2 Hours, in advance of need  - Initiate/Maintain alarm  - Obtain necessary fall risk management equipment  - Apply yellow socks and bracelet for high fall risk patients  - Consider moving patient to room near nurses station  Outcome: Progressing  Goal: Maintain or return to baseline ADL function  Description: INTERVENTIONS:  -  Assess patient's ability to carry out ADLs; assess patient's baseline for ADL function and identify physical deficits which impact ability to perform ADLs (bathing, care of mouth/teeth, toileting, grooming, dressing, etc.)  - Assess/evaluate cause of self-care deficits   - Assess range of motion  - Assess patient's mobility; develop plan if impaired  - Assess patient's need for assistive devices and provide as appropriate  - Encourage maximum independence but intervene and supervise when necessary  - Involve family in performance of ADLs  - Assess for home care needs following discharge   - Consider OT consult to assist with ADL evaluation and planning for discharge  - Provide patient education as appropriate  Outcome: Progressing  Goal: Maintains/Returns to pre admission functional level  Description: INTERVENTIONS:  -  Perform AM-PAC 6 Click Basic Mobility/ Daily Activity assessment daily.  - Set and communicate daily mobility goal to care team and patient/family/caregiver.   - Collaborate with rehabilitation services on mobility goals if consulted  - Perform Range of Motion 3 times a day.  - Reposition patient every 3 hours.  - Dangle patient 3 times a day  - Stand patient 3 times a day  - Ambulate patient 3 times a day  - Out of bed to chair 3 times a day   - Out of bed for meals 3 times a day  - Out of bed for toileting  - Record patient progress and toleration of activity level   Outcome: Progressing     Problem: DISCHARGE PLANNING  Goal: Discharge to home or other facility with appropriate resources  Description: INTERVENTIONS:  - Identify barriers to discharge w/patient and caregiver  - Arrange for needed discharge resources and transportation as appropriate  - Identify discharge learning needs (meds, wound care, etc.)  - Arrange for interpretive services to assist at discharge as needed  - Refer to Case Management Department for coordinating discharge planning if the patient needs post-hospital services based on physician/advanced practitioner order or complex needs related to functional status, cognitive ability, or social support system  Outcome: Progressing     Problem: Knowledge Deficit  Goal: Patient/family/caregiver demonstrates understanding of disease process, treatment plan, medications, and discharge instructions  Description: Complete learning assessment and assess knowledge base.  Interventions:  - Provide teaching at level of understanding  - Provide teaching via preferred learning methods  Outcome: Progressing     Problem: Nutrition/Hydration-ADULT  Goal: Nutrient/Hydration intake appropriate for improving, restoring or maintaining nutritional needs  Description: Monitor and assess patient's nutrition/hydration status for malnutrition. Collaborate with interdisciplinary team and initiate plan and interventions  as ordered.  Monitor patient's weight and dietary intake as ordered or per policy. Utilize nutrition screening tool and intervene as necessary. Determine patient's food preferences and provide high-protein, high-caloric foods as appropriate.     INTERVENTIONS:  - Monitor oral intake, urinary output, labs, and treatment plans  - Assess nutrition and hydration status and recommend course of action  - Evaluate amount of meals eaten  - Assist patient with eating if necessary   - Allow adequate time for meals  - Recommend/ encourage appropriate diets, oral nutritional supplements, and vitamin/mineral supplements  - Order, calculate, and assess calorie counts as needed  - Recommend, monitor, and adjust tube feedings and TPN/PPN based on assessed needs  - Assess need for intravenous fluids  - Provide specific nutrition/hydration education as appropriate  - Include patient/family/caregiver in decisions related to nutrition  Outcome: Progressing

## 2025-03-07 NOTE — PLAN OF CARE
Problem: Potential for Falls  Goal: Patient will remain free of falls  Description: INTERVENTIONS:  - Educate patient/family on patient safety including physical limitations  - Instruct patient to call for assistance with activity   - Consult OT/PT to assist with strengthening/mobility   - Keep Call bell within reach  - Keep bed low and locked with side rails adjusted as appropriate  - Keep care items and personal belongings within reach  - Initiate and maintain comfort rounds  - Make Fall Risk Sign visible to staff  - Offer Toileting every x Hours, in advance of need  - Initiate/Maintain x alarm  - Obtain necessary fall risk management equipment: x  - Apply yellow socks and bracelet for high fall risk patients  - Consider moving patient to room near nurses station  Outcome: Progressing     Problem: PAIN - ADULT  Goal: Verbalizes/displays adequate comfort level or baseline comfort level  Description: Interventions:  - Encourage patient to monitor pain and request assistance  - Assess pain using appropriate pain scale  - Administer analgesics based on type and severity of pain and evaluate response  - Implement non-pharmacological measures as appropriate and evaluate response  - Consider cultural and social influences on pain and pain management  - Notify physician/advanced practitioner if interventions unsuccessful or patient reports new pain  Outcome: Progressing     Problem: INFECTION - ADULT  Goal: Absence or prevention of progression during hospitalization  Description: INTERVENTIONS:  - Assess and monitor for signs and symptoms of infection  - Monitor lab/diagnostic results  - Monitor all insertion sites, i.e. indwelling lines, tubes, and drains  - Monitor endotracheal if appropriate and nasal secretions for changes in amount and color  - [place] appropriate cooling/warming therapies per order  - Administer medications as ordered  - Instruct and encourage patient and family to use good hand hygiene  technique  - Identify and instruct in appropriate isolation precautions for identified infection/condition  Outcome: Progressing  Goal: Absence of fever/infection during neutropenic period  Description: INTERVENTIONS:  - Monitor WBC    Outcome: Progressing     Problem: SAFETY ADULT  Goal: Patient will remain free of falls  Description: INTERVENTIONS:  - Educate patient/family on patient safety including physical limitations  - Instruct patient to call for assistance with activity   - Consult OT/PT to assist with strengthening/mobility   - Keep Call bell within reach  - Keep bed low and locked with side rails adjusted as appropriate  - Keep care items and personal belongings within reach  - Initiate and maintain comfort rounds  - Make Fall Risk Sign visible to staff  - Offer Toileting every x Hours, in advance of need  - Initiate/Maintain xalarm  - Obtain necessary fall risk management equipment: x  - Apply yellow socks and bracelet for high fall risk patients  - Consider moving patient to room near nurses station  Outcome: Progressing  Goal: Maintain or return to baseline ADL function  Description: INTERVENTIONS:  -  Assess patient's ability to carry out ADLs; assess patient's baseline for ADL function and identify physical deficits which impact ability to perform ADLs (bathing, care of mouth/teeth, toileting, grooming, dressing, etc.)  - Assess/evaluate cause of self-care deficits   - Assess range of motion  - Assess patient's mobility; develop plan if impaired  - Assess patient's need for assistive devices and provide as appropriate  - Encourage maximum independence but intervene and supervise when necessary  - Involve family in performance of ADLs  - Assess for home care needs following discharge   - Consider OT consult to assist with ADL evaluation and planning for discharge  - Provide patient education as appropriate  Outcome: Progressing  Goal: Maintains/Returns to pre admission functional level  Description:  INTERVENTIONS:  - Perform AM-PAC 6 Click Basic Mobility/ Daily Activity assessment daily.  - Set and communicate daily mobility goal to care team and patient/family/caregiver.   - Collaborate with rehabilitation services on mobility goals if consulted  - Perform Range of Motion x times a day.  - Reposition patient every x hours.  - Dangle patient x times a day  - Stand patient x times a day  - Ambulate patient x times a day  - Out of bed to chair x times a day   - Out of bed for meals x times a day  - Out of bed for toileting  - Record patient progress and toleration of activity level   Outcome: Progressing     Problem: DISCHARGE PLANNING  Goal: Discharge to home or other facility with appropriate resources  Description: INTERVENTIONS:  - Identify barriers to discharge w/patient and caregiver  - Arrange for needed discharge resources and transportation as appropriate  - Identify discharge learning needs (meds, wound care, etc.)  - Arrange for interpretive services to assist at discharge as needed  - Refer to Case Management Department for coordinating discharge planning if the patient needs post-hospital services based on physician/advanced practitioner order or complex needs related to functional status, cognitive ability, or social support system  Outcome: Progressing     Problem: Knowledge Deficit  Goal: Patient/family/caregiver demonstrates understanding of disease process, treatment plan, medications, and discharge instructions  Description: Complete learning assessment and assess knowledge base.  Interventions:  - Provide teaching at level of understanding  - Provide teaching via preferred learning methods  Outcome: Progressing     Problem: Nutrition/Hydration-ADULT  Goal: Nutrient/Hydration intake appropriate for improving, restoring or maintaining nutritional needs  Description: Monitor and assess patient's nutrition/hydration status for malnutrition. Collaborate with interdisciplinary team and initiate plan  and interventions as ordered.  Monitor patient's weight and dietary intake as ordered or per policy. Utilize nutrition screening tool and intervene as necessary. Determine patient's food preferences and provide high-protein, high-caloric foods as appropriate.     INTERVENTIONS:  - Monitor oral intake, urinary output, labs, and treatment plans  - Assess nutrition and hydration status and recommend course of action  - Evaluate amount of meals eaten  - Assist patient with eating if necessary   - Allow adequate time for meals  - Recommend/ encourage appropriate diets, oral nutritional supplements, and vitamin/mineral supplements  - Order, calculate, and assess calorie counts as needed  - Recommend, monitor, and adjust tube feedings and TPN/PPN based on assessed needs  - Assess need for intravenous fluids  - Provide specific nutrition/hydration education as appropriate  - Include patient/family/caregiver in decisions related to nutrition  Outcome: Progressing

## 2025-03-08 PROBLEM — R00.1 BRADYCARDIA: Status: ACTIVE | Noted: 2025-03-08

## 2025-03-08 LAB
ANION GAP SERPL CALCULATED.3IONS-SCNC: 6 MMOL/L (ref 4–13)
BACTERIA BLD CULT: ABNORMAL
BUN SERPL-MCNC: 15 MG/DL (ref 5–25)
CALCIUM SERPL-MCNC: 9 MG/DL (ref 8.4–10.2)
CHLORIDE SERPL-SCNC: 100 MMOL/L (ref 96–108)
CO2 SERPL-SCNC: 34 MMOL/L (ref 21–32)
CREAT SERPL-MCNC: 0.51 MG/DL (ref 0.6–1.3)
GFR SERPL CREATININE-BSD FRML MDRD: 102 ML/MIN/1.73SQ M
GLUCOSE SERPL-MCNC: 105 MG/DL (ref 65–140)
GRAM STN SPEC: ABNORMAL
POTASSIUM SERPL-SCNC: 3.6 MMOL/L (ref 3.5–5.3)
PROCALCITONIN SERPL-MCNC: 0.09 NG/ML
SODIUM SERPL-SCNC: 140 MMOL/L (ref 135–147)
STREPTOCOCCUS DNA BLD POS NAA+NON-PROBE: DETECTED
T4 FREE SERPL-MCNC: 0.91 NG/DL (ref 0.61–1.12)
TSH SERPL DL<=0.05 MIU/L-ACNC: 5.97 UIU/ML (ref 0.45–4.5)

## 2025-03-08 PROCEDURE — 94760 N-INVAS EAR/PLS OXIMETRY 1: CPT

## 2025-03-08 PROCEDURE — 80048 BASIC METABOLIC PNL TOTAL CA: CPT | Performed by: INTERNAL MEDICINE

## 2025-03-08 PROCEDURE — 84443 ASSAY THYROID STIM HORMONE: CPT

## 2025-03-08 PROCEDURE — 84439 ASSAY OF FREE THYROXINE: CPT

## 2025-03-08 PROCEDURE — 84145 PROCALCITONIN (PCT): CPT | Performed by: INTERNAL MEDICINE

## 2025-03-08 PROCEDURE — 99233 SBSQ HOSP IP/OBS HIGH 50: CPT

## 2025-03-08 PROCEDURE — 94640 AIRWAY INHALATION TREATMENT: CPT

## 2025-03-08 RX ADMIN — LEVALBUTEROL HYDROCHLORIDE 1.25 MG: 1.25 SOLUTION RESPIRATORY (INHALATION) at 07:48

## 2025-03-08 RX ADMIN — IPRATROPIUM BROMIDE 0.5 MG: 0.5 SOLUTION RESPIRATORY (INHALATION) at 19:59

## 2025-03-08 RX ADMIN — GUAIFENESIN 600 MG: 600 TABLET ORAL at 22:52

## 2025-03-08 RX ADMIN — HYDROCODONE POLISTIREX AND CHLORPHENIRAMINE POLISTIREX 5 ML: 10; 8 SUSPENSION, EXTENDED RELEASE ORAL at 12:42

## 2025-03-08 RX ADMIN — ATORVASTATIN CALCIUM 20 MG: 20 TABLET, FILM COATED ORAL at 16:42

## 2025-03-08 RX ADMIN — HEPARIN SODIUM 5000 UNITS: 5000 INJECTION INTRAVENOUS; SUBCUTANEOUS at 22:51

## 2025-03-08 RX ADMIN — ATENOLOL 50 MG: 50 TABLET ORAL at 08:37

## 2025-03-08 RX ADMIN — IPRATROPIUM BROMIDE 0.5 MG: 0.5 SOLUTION RESPIRATORY (INHALATION) at 14:04

## 2025-03-08 RX ADMIN — HYDROCHLOROTHIAZIDE 25 MG: 25 TABLET ORAL at 08:37

## 2025-03-08 RX ADMIN — IPRATROPIUM BROMIDE 0.5 MG: 0.5 SOLUTION RESPIRATORY (INHALATION) at 07:48

## 2025-03-08 RX ADMIN — GUAIFENESIN 600 MG: 600 TABLET ORAL at 08:34

## 2025-03-08 RX ADMIN — HEPARIN SODIUM 5000 UNITS: 5000 INJECTION INTRAVENOUS; SUBCUTANEOUS at 05:53

## 2025-03-08 RX ADMIN — HEPARIN SODIUM 5000 UNITS: 5000 INJECTION INTRAVENOUS; SUBCUTANEOUS at 14:55

## 2025-03-08 RX ADMIN — OSELTAMIVIR PHOSPHATE 75 MG: 75 CAPSULE ORAL at 08:36

## 2025-03-08 RX ADMIN — LEVALBUTEROL HYDROCHLORIDE 1.25 MG: 1.25 SOLUTION RESPIRATORY (INHALATION) at 19:59

## 2025-03-08 RX ADMIN — LEVALBUTEROL HYDROCHLORIDE 1.25 MG: 1.25 SOLUTION RESPIRATORY (INHALATION) at 14:04

## 2025-03-08 RX ADMIN — CEFTRIAXONE 2000 MG: 2 INJECTION, SOLUTION INTRAVENOUS at 19:38

## 2025-03-08 RX ADMIN — LEVOTHYROXINE SODIUM 125 MCG: 25 TABLET ORAL at 05:53

## 2025-03-08 RX ADMIN — OSELTAMIVIR PHOSPHATE 75 MG: 75 CAPSULE ORAL at 22:51

## 2025-03-08 NOTE — ASSESSMENT & PLAN NOTE
Hx of hypothyroidism.   Noted to have HR 40-50s.  Last bloodwork from 7/2023.  Takes 125mcg po levothyroxine daily.  Ordered TSH, free T4.  Continue w/ levothyroxine.

## 2025-03-08 NOTE — ASSESSMENT & PLAN NOTE
88% on RA   Requiring 3 L NC -now attempting to wean to room air, oxygen saturations around 90 currently  Suspect hypoxia in setting of Influenza A, bronchitis  Receiving tamiflu, IV ceftriaxone     Follow-up:  Scheduled nebs   Respiratory protocol   Will need outpatient PFTs  Home O2 eval prior to d/c

## 2025-03-08 NOTE — PLAN OF CARE

## 2025-03-08 NOTE — ASSESSMENT & PLAN NOTE
Cough, congestion, SOB, generalized weakness over the past couple of days  88% on RA. Requiring 3 L NC   Weaned to room air wit O2 sats around 90 - monitor closely may need to place back on O2  Meeting SIRS on admission  Flu A +   Started on Tamiflu, continue  Also started on ceftriaxone with concern for superimposed bacterial infection, elevated procalcitonin.  Procalcitonin now normalized x2.  Had positive blood culture, however, suspect contaminant, repeat blood cx ordered.  Follow-up on repeat blood culture results.  Continue with ceftriaxone in the interim.  Dose increased to 2 g daily per patient weight.    Follow-up:  Repeat blood culture NGTD for 24hrs. Pending final results.  Continue IV abx.  Continue w/ Tamiflu.  Manage symptoms   Neb treatments

## 2025-03-08 NOTE — ASSESSMENT & PLAN NOTE
SIRS: Fever (100.6) + WBC 16.05   Procal: 0.49. continue to trend   Lactic WNL   Likely viral sepsis due to Influenza infection:   Possible superimposed bacterial infection  CT chest wo contrast   Diffuse bronchial wall thickening, suggestive of bronchitis or reactive airways disease.  Evaluation of lung parenchyma significantly limited by motion artifact, in particular in the lower lungs. Suggestion of some tree-in-bud nodularity in the right middle lobe, and some tubular opacities in the right lower lobe, likely infectious.  Mild mediastinal lymphadenopathy, likely reactive.  Covid/RSV negative   BC 1/2 sets with alphahemolytic strep, Streptococcus para sanguinous, considerations for skin contaminant.  Await repeat culture results  Procalcitonin x2 elevated -continue ceftriaxone, discontinue azithromycin  Repeat blood cultures pending    Follow-up:  Continue IV abx.  Follow-up repeat blood culture results.

## 2025-03-08 NOTE — PROGRESS NOTES
Progress Note - Hospitalist   Name: Joann Khanna 64 y.o. female I MRN: [id number]  Unit/Bed#: -01 I Date of Admission: 3/4/2025   Date of Service: 3/8/2025 I Hospital Day: 4    Assessment & Plan  Influenza A  Cough, congestion, SOB, generalized weakness over the past couple of days  88% on RA. Requiring 3 L NC   Weaned to room air wit O2 sats around 90 - monitor closely may need to place back on O2  Meeting SIRS on admission  Flu A +   Started on Tamiflu, continue  Also started on ceftriaxone with concern for superimposed bacterial infection, elevated procalcitonin.  Procalcitonin now normalized x2.  Had positive blood culture, however, suspect contaminant, repeat blood cx ordered.  Follow-up on repeat blood culture results.  Continue with ceftriaxone in the interim.  Dose increased to 2 g daily per patient weight.    Follow-up:  Repeat blood culture NGTD for 24hrs. Pending final results.  Continue IV abx.  Continue w/ Tamiflu.  Manage symptoms   Neb treatments  Sepsis (HCC)  SIRS: Fever (100.6) + WBC 16.05   Procal: 0.49. continue to trend   Lactic WNL   Likely viral sepsis due to Influenza infection:   Possible superimposed bacterial infection  CT chest wo contrast   Diffuse bronchial wall thickening, suggestive of bronchitis or reactive airways disease.  Evaluation of lung parenchyma significantly limited by motion artifact, in particular in the lower lungs. Suggestion of some tree-in-bud nodularity in the right middle lobe, and some tubular opacities in the right lower lobe, likely infectious.  Mild mediastinal lymphadenopathy, likely reactive.  Covid/RSV negative   BC 1/2 sets with alphahemolytic strep, Streptococcus para sanguinous, considerations for skin contaminant.  Await repeat culture results  Procalcitonin x2 elevated -continue ceftriaxone, discontinue azithromycin  Repeat blood cultures pending    Follow-up:  Continue IV abx.  Follow-up repeat blood culture results.  Bradycardia  Hx of  hypothyroidism.   Noted to have HR 40-50s.  Last bloodwork from 7/2023.  Takes 125mcg po levothyroxine daily.  Ordered TSH, free T4.  Continue w/ levothyroxine.  Positive blood culture  Alphahemolytic Streptococcus, 1 of 2 sets -suspicion for skin contaminant  Continue ceftriaxone for now  Repeated set of blood cultures pending  Acute respiratory failure with hypoxia (HCC)  88% on RA   Requiring 3 L NC -now attempting to wean to room air, oxygen saturations around 90 currently  Suspect hypoxia in setting of Influenza A, bronchitis  Receiving tamiflu, IV ceftriaxone     Follow-up:  Scheduled [facility]   Respiratory protocol   Will need outpatient PFTs  Home O2 eval prior to d/c  Hypertension  Home regimen:   HCTZ 25 mg daily   Atenolol 50 mg daily   Postoperative hypothyroidism  Continue levothyroxine   Mild hyperlipidemia  Continue statin     VTE Pharmacologic Prophylaxis: VTE Score: 3 Moderate Risk (Score 3-4) - Pharmacological DVT Prophylaxis Ordered: heparin.    Mobility:   Basic Mobility Inpatient Raw Score: 23  JH-HLM Goal: 7: Walk 25 feet or more  JH-HLM Achieved: 7: Walk 25 feet or more  JH-HLM Goal achieved. Continue to encourage appropriate mobility.    Patient Centered Rounds: I performed bedside rounds with nursing staff today.   Discussions with Specialists or Other Care Team Provider: CM    Education and Discussions with Family / Patient: Updated  (sister) at bedside.    Current Length of Stay: 4 day(s)  Current Patient Status: Inpatient   Certification Statement: The patient will continue to require additional inpatient hospital stay due to pending blood culture results.  Discharge Plan: Anticipate discharge in 48-72 hrs to home.    Code Status: Level 1 - Full Code    Subjective   Sister present. Patient seen sitting at the edge of the bed. She has not really eaten very much. We discussed how her thyroid issues may be affecting her heart rate. I also reviewed that her fatigue may be related to  her ongoing influenza infection. No other concerns.    Objective :  Temp:  [97.9 °F (36.6 °C)] 97.9 °F (36.6 °C)  HR:  [55-63] 63  BP: (125-154)/(64-81) 134/64  Resp:  [18] 18  SpO2:  [90 %-94 %] 93 %  O2 Device: None (Room air)  Nasal Cannula O2 Flow Rate (L/min):  [2 L/min] 2 L/min    There is no height or weight on file to calculate BMI.     Input and Output Summary (last 24 hours):     Intake/Output Summary (Last 24 hours) at 3/8/2025 1120  Last data filed at 3/8/2025 0101  Gross per 24 hour   Intake 1304 ml   Output --   Net 1304 ml       Physical Exam  Vitals reviewed.   Constitutional:       General: She is not in acute distress.     Appearance: Normal appearance.   HENT:      Head: Normocephalic and atraumatic.   Eyes:      Conjunctiva/sclera: Conjunctivae normal.   Cardiovascular:      Rate and Rhythm: Regular rhythm. Bradycardia present.      Pulses: Normal pulses.      Heart sounds: Normal heart sounds.   Pulmonary:      Effort: Pulmonary effort is normal. No respiratory distress.      Breath sounds: Rhonchi and rales present. No wheezing.   Abdominal:      General: Bowel sounds are normal. There is no distension.      Palpations: Abdomen is soft.      Tenderness: There is no abdominal tenderness. There is no guarding or rebound.   Musculoskeletal:      Right lower leg: No edema.      Left lower leg: No edema.   Skin:     General: Skin is warm and dry.   Neurological:      Mental Status: She is alert and oriented to person, place, and time.   Psychiatric:         Mood and Affect: Mood normal.         Behavior: Behavior normal.         Thought Content: Thought content normal.           Lines/Drains:          Lab Results: I have reviewed the following results:   Results from last 7 days   Lab Units 03/07/25  0451 03/06/25  0401 03/05/25  0445 03/04/25  1708   WBC Thousand/uL 6.32 8.92   < > 16.05*   HEMOGLOBIN g/dL 12.7 13.5   < > 15.4   HEMATOCRIT % 39.8 41.5   < > 47.1*   PLATELETS Thousands/uL 246 227    < > 247   BANDS PCT %  --   --   --  4   SEGS PCT %  --  77*   < >  --    LYMPHO PCT %  --  15   < > 11*   MONO PCT %  --  7   < > 16*   EOS PCT %  --  0   < > 0    < > = values in this interval not displayed.     Results from last 7 days   Lab Units 03/08/25  0555 03/06/25  0401 03/05/25  0445   SODIUM mmol/L 140   < > 138   POTASSIUM mmol/L 3.6   < > 3.0*   CHLORIDE mmol/L 100   < > 100   CO2 mmol/L 34*   < > 29   BUN mg/dL 15   < > 35*   CREATININE mg/dL 0.51*   < > 0.61   ANION GAP mmol/L 6   < > 9   CALCIUM mg/dL 9.0   < > 8.8   ALBUMIN g/dL  --   --  3.9   TOTAL BILIRUBIN mg/dL  --   --  0.64   ALK PHOS U/L  --   --  59   ALT U/L  --   --  24   AST U/L  --   --  44*   GLUCOSE RANDOM mg/dL 105   < > 122    < > = values in this interval not displayed.     Results from last 7 days   Lab Units 03/04/25  1731   INR  1.06             Results from last 7 days   Lab Units 03/08/25  0555 03/07/25  0451 03/05/25  0445 03/04/25  1731   LACTIC ACID mmol/L  --   --   --  1.4   PROCALCITONIN ng/ml 0.09 0.14 0.51* 0.49*       Recent Cultures (last 7 days):   Results from last 7 days   Lab Units 03/06/25  1650 03/06/25  1453 03/04/25  1731   BLOOD CULTURE   --  No Growth at 24 hrs.  No Growth at 24 hrs. No Growth at 72 hrs.  Streptococcus parasanguinis*   GRAM STAIN RESULT   --   --  Gram positive cocci in pairs and chains*   LEGIONELLA URINARY ANTIGEN  Negative  --   --        Imaging Results Review: No pertinent imaging studies reviewed.  Other Study Results Review: No additional pertinent studies reviewed.    Last 24 Hours Medication List:     Current Facility-Administered Medications:     acetaminophen (TYLENOL) tablet 650 mg, Q6H PRN    albuterol (PROVENTIL HFA,VENTOLIN HFA) inhaler 2 puff, Q4H PRN    albuterol inhalation solution 2.5 mg, Q6H PRN    atenolol (TENORMIN) tablet 50 mg, Daily    atorvastatin (LIPITOR) tablet 20 mg, Daily With Dinner    cefTRIAXone (ROCEPHIN) IVPB (premix in dextrose) 2,000 mg 50 mL, Q24H,  Last Rate: 2,000 mg (03/07/25 1809)    guaiFENesin (MUCINEX) 12 hr tablet 600 mg, Q12H JERAD    heparin (porcine) subcutaneous injection 5,000 Units, Q8H JERAD    hydroCHLOROthiazide tablet 25 mg, Daily    Hydrocod Sharath-Chlorphe Sharath ER (TUSSIONEX) ER suspension 5 mL, Q12H PRN    ipratropium (ATROVENT) 0.02 % inhalation solution 0.5 mg, TID    levalbuterol (XOPENEX) inhalation solution 1.25 mg, TID    levothyroxine tablet 125 mcg, Early Morning    ondansetron (ZOFRAN) injection 4 mg, Q6H PRN    oseltamivir (TAMIFLU) capsule 75 mg, Q12H JERAD    Administrative Statements   Today, Patient Was Seen By: Raisa Bowman, DO  I have spent a total time of 40 minutes in caring for this patient on the day of the visit/encounter including Risks and benefits of tx options, Patient and family education, Counseling / Coordination of care, Documenting in the medical record, Reviewing/placing orders in the medical record (including tests, medications, and/or procedures), Obtaining or reviewing history  , and Communicating with other healthcare professionals .    **Please Note: This note may have been constructed using a voice recognition system.**

## 2025-03-09 LAB
ANION GAP SERPL CALCULATED.3IONS-SCNC: 8 MMOL/L (ref 4–13)
BACTERIA BLD CULT: NORMAL
BASOPHILS # BLD MANUAL: 0 THOUSAND/UL (ref 0–0.1)
BASOPHILS NFR MAR MANUAL: 0 % (ref 0–1)
BUN SERPL-MCNC: 11 MG/DL (ref 5–25)
CALCIUM SERPL-MCNC: 9 MG/DL (ref 8.4–10.2)
CHLORIDE SERPL-SCNC: 100 MMOL/L (ref 96–108)
CO2 SERPL-SCNC: 29 MMOL/L (ref 21–32)
CREAT SERPL-MCNC: 0.49 MG/DL (ref 0.6–1.3)
EOSINOPHIL # BLD MANUAL: 0.16 THOUSAND/UL (ref 0–0.4)
EOSINOPHIL NFR BLD MANUAL: 2 % (ref 0–6)
ERYTHROCYTE [DISTWIDTH] IN BLOOD BY AUTOMATED COUNT: 14.5 % (ref 11.6–15.1)
GFR SERPL CREATININE-BSD FRML MDRD: 104 ML/MIN/1.73SQ M
GLUCOSE SERPL-MCNC: 114 MG/DL (ref 65–140)
HCT VFR BLD AUTO: 40.7 % (ref 34.8–46.1)
HGB BLD-MCNC: 13.4 G/DL (ref 11.5–15.4)
LYMPHOCYTES # BLD AUTO: 2.91 THOUSAND/UL (ref 0.6–4.47)
LYMPHOCYTES # BLD AUTO: 35 % (ref 14–44)
MCH RBC QN AUTO: 29.3 PG (ref 26.8–34.3)
MCHC RBC AUTO-ENTMCNC: 32.9 G/DL (ref 31.4–37.4)
MCV RBC AUTO: 89 FL (ref 82–98)
METAMYELOCYTE ABSOLUTE CT: 0.08 THOUSAND/UL (ref 0–0.1)
METAMYELOCYTES NFR BLD MANUAL: 1 % (ref 0–1)
MONOCYTES # BLD AUTO: 0.49 THOUSAND/UL (ref 0–1.22)
MONOCYTES NFR BLD: 6 % (ref 4–12)
NEUTROPHILS # BLD MANUAL: 4.45 THOUSAND/UL (ref 1.85–7.62)
NEUTS SEG NFR BLD AUTO: 55 % (ref 43–75)
PLATELET # BLD AUTO: 303 THOUSANDS/UL (ref 149–390)
PLATELET BLD QL SMEAR: ADEQUATE
PMV BLD AUTO: 9.6 FL (ref 8.9–12.7)
POTASSIUM SERPL-SCNC: 3.7 MMOL/L (ref 3.5–5.3)
RBC # BLD AUTO: 4.58 MILLION/UL (ref 3.81–5.12)
RBC MORPH BLD: NORMAL
SODIUM SERPL-SCNC: 137 MMOL/L (ref 135–147)
VARIANT LYMPHS # BLD AUTO: 1 %
WBC # BLD AUTO: 8.09 THOUSAND/UL (ref 4.31–10.16)

## 2025-03-09 PROCEDURE — 94760 N-INVAS EAR/PLS OXIMETRY 1: CPT

## 2025-03-09 PROCEDURE — 99232 SBSQ HOSP IP/OBS MODERATE 35: CPT | Performed by: HOSPITALIST

## 2025-03-09 PROCEDURE — 94761 N-INVAS EAR/PLS OXIMETRY MLT: CPT

## 2025-03-09 PROCEDURE — 85027 COMPLETE CBC AUTOMATED: CPT

## 2025-03-09 PROCEDURE — 94640 AIRWAY INHALATION TREATMENT: CPT

## 2025-03-09 PROCEDURE — 80048 BASIC METABOLIC PNL TOTAL CA: CPT

## 2025-03-09 PROCEDURE — 85007 BL SMEAR W/DIFF WBC COUNT: CPT

## 2025-03-09 RX ORDER — ATENOLOL 25 MG/1
12.5 TABLET ORAL DAILY
Status: DISCONTINUED | OUTPATIENT
Start: 2025-03-09 | End: 2025-03-10

## 2025-03-09 RX ADMIN — LEVALBUTEROL HYDROCHLORIDE 1.25 MG: 1.25 SOLUTION RESPIRATORY (INHALATION) at 15:12

## 2025-03-09 RX ADMIN — GUAIFENESIN 600 MG: 600 TABLET ORAL at 21:17

## 2025-03-09 RX ADMIN — LEVOTHYROXINE SODIUM 125 MCG: 25 TABLET ORAL at 06:56

## 2025-03-09 RX ADMIN — LEVALBUTEROL HYDROCHLORIDE 1.25 MG: 1.25 SOLUTION RESPIRATORY (INHALATION) at 19:23

## 2025-03-09 RX ADMIN — OSELTAMIVIR PHOSPHATE 75 MG: 75 CAPSULE ORAL at 08:54

## 2025-03-09 RX ADMIN — GUAIFENESIN 600 MG: 600 TABLET ORAL at 08:54

## 2025-03-09 RX ADMIN — HEPARIN SODIUM 5000 UNITS: 5000 INJECTION INTRAVENOUS; SUBCUTANEOUS at 21:17

## 2025-03-09 RX ADMIN — IPRATROPIUM BROMIDE 0.5 MG: 0.5 SOLUTION RESPIRATORY (INHALATION) at 19:22

## 2025-03-09 RX ADMIN — HEPARIN SODIUM 5000 UNITS: 5000 INJECTION INTRAVENOUS; SUBCUTANEOUS at 06:56

## 2025-03-09 RX ADMIN — ATORVASTATIN CALCIUM 20 MG: 20 TABLET, FILM COATED ORAL at 16:18

## 2025-03-09 RX ADMIN — HEPARIN SODIUM 5000 UNITS: 5000 INJECTION INTRAVENOUS; SUBCUTANEOUS at 16:18

## 2025-03-09 RX ADMIN — IPRATROPIUM BROMIDE 0.5 MG: 0.5 SOLUTION RESPIRATORY (INHALATION) at 15:12

## 2025-03-09 RX ADMIN — HYDROCHLOROTHIAZIDE 25 MG: 25 TABLET ORAL at 08:54

## 2025-03-09 RX ADMIN — IPRATROPIUM BROMIDE 0.5 MG: 0.5 SOLUTION RESPIRATORY (INHALATION) at 07:51

## 2025-03-09 RX ADMIN — LEVALBUTEROL HYDROCHLORIDE 1.25 MG: 1.25 SOLUTION RESPIRATORY (INHALATION) at 07:51

## 2025-03-09 RX ADMIN — CEFTRIAXONE 2000 MG: 2 INJECTION, SOLUTION INTRAVENOUS at 18:13

## 2025-03-09 NOTE — ASSESSMENT & PLAN NOTE
Hx of hypothyroidism.   Noted to have HR 40-50s.  Last bloodwork from 7/2023.  -TFTs acceptable  -decrease Atenolol for 50 to 12.5 (First dose 3/10/25AM pending HR trend today)

## 2025-03-09 NOTE — RESPIRATORY THERAPY NOTE
Home Oxygen Qualifying Test     Patient name: Joann Khanna        : 1960   Date of Test:  2025  Diagnosis:    Home Oxygen Test:    **Medicare Guidelines require item(s) 1-5 on all ambulatory patients or 1 and 2 on non-ambulatory patients.    1. Baseline SPO2 on Room Air at rest 92 %   If <= 88% on Room Air add O2 via NC to obtain SpO2 >=88%. If LPM needed, document LPM needed to reach =>88%    SPO2 during exertion on Room Air 91  %  During exertion monitor SPO2. If SPO2 increases >=89%, do not add supplemental oxygen    SPO2 on Oxygen at Rest  % at  LPM    SPO2 during exertion on Oxygen  % at LPM    Test performed during exertion activity.      []  Supplemental Home Oxygen is indicated.    [x]  Client does not qualify for home oxygen.    Respiratory Additional Notes- pt ambulated approx. 250 feet    Blanquita Cash, RT

## 2025-03-09 NOTE — ASSESSMENT & PLAN NOTE
SIRS: Fever (100.6) + WBC 16.05   Procal: 0.49. continue to trend   Lactic WNL   Likely viral sepsis due to Influenza infection:   Possible superimposed bacterial infection  CT chest wo contrast   Diffuse bronchial wall thickening, suggestive of bronchitis or reactive airways disease.  Evaluation of lung parenchyma significantly limited by motion artifact, in particular in the lower lungs. Suggestion of some tree-in-bud nodularity in the right middle lobe, and some tubular opacities in the right lower lobe, likely infectious.  Mild mediastinal lymphadenopathy, likely reactive.  Covid/RSV negative   BC 1/2 sets with alphahemolytic strep, Streptococcus para sanguinous, considerations for skin contaminant.  Await repeat culture results  Procalcitonin x2 elevated, now normalized -continue ceftriaxone, was on azithromycin which has been stopped  Repeat BCxs NG x 48 hours

## 2025-03-09 NOTE — ASSESSMENT & PLAN NOTE
Cough, congestion, SOB, generalized weakness over the past couple of days  88% on RA. Requiring 3 L NC   Weaned to room air wit O2 sats around 90 - monitor closely may need to place back on O2  Meeting SIRS on admission  Flu A +   Started on Tamiflu, continue  Also started on ceftriaxone with concern for superimposed bacterial infection, elevated procalcitonin.  Procalcitonin now normalized x2.  Had positive blood culture, however, suspect contaminant, repeat BCxs NGTD (48 hours). Continue with ceftriaxone in the interim.  Dose increased to 2 g daily per patient weight.

## 2025-03-09 NOTE — PROGRESS NOTES
Progress Note - Hospitalist   Name: Joann Khanna 64 y.o. female I MRN: [id number]  Unit/Bed#: -01 I Date of Admission: 3/4/2025   Date of Service: 3/9/2025 I Hospital Day: 5    Assessment & Plan  Influenza A  Cough, congestion, SOB, generalized weakness over the past couple of days  88% on RA. Requiring 3 L NC   Weaned to room air wit O2 sats around 90 - monitor closely may need to place back on O2  Meeting SIRS on admission  Flu A +   Started on Tamiflu, continue  Also started on ceftriaxone with concern for superimposed bacterial infection, elevated procalcitonin.  Procalcitonin now normalized x2.  Had positive blood culture, however, suspect contaminant, repeat BCxs NGTD (48 hours). Continue with ceftriaxone in the interim.  Dose increased to 2 g daily per patient weight.  Sepsis (HCC)  SIRS: Fever (100.6) + WBC 16.05   Procal: 0.49. continue to trend   Lactic WNL   Likely viral sepsis due to Influenza infection:   Possible superimposed bacterial infection  CT chest wo contrast   Diffuse bronchial wall thickening, suggestive of bronchitis or reactive airways disease.  Evaluation of lung parenchyma significantly limited by motion artifact, in particular in the lower lungs. Suggestion of some tree-in-bud nodularity in the right middle lobe, and some tubular opacities in the right lower lobe, likely infectious.  Mild mediastinal lymphadenopathy, likely reactive.  Covid/RSV negative   BC 1/2 sets with alphahemolytic strep, Streptococcus para sanguinous, considerations for skin contaminant.  Await repeat culture results  Procalcitonin x2 elevated, now normalized -continue ceftriaxone, was on azithromycin which has been stopped  Repeat BCxs NG x 48 hours  Bradycardia  Hx of hypothyroidism.   Noted to have HR 40-50s.  Last bloodwork from 7/2023.  -TFTs acceptable  -decrease Atenolol for 50 to 12.5 (First dose 3/10/25AM pending HR trend today)  Positive blood culture  Alphahemolytic Streptococcus, 1 of 2 sets  -suspicion for skin contaminant  Continue ceftriaxone for now  Repeat BCxs NG x 48 hours  Acute respiratory failure with hypoxia (HCC)  88% on RA   Was requiring 3 L NC, currently on 2 L NC  Suspect hypoxia in setting of Influenza A, bronchitis  Receiving tamiflu, IV ceftriaxone   Home O2 eval  Hypertension  Home regimen:   HCTZ 25 mg daily   decrease Atenolol for 50 to 12.5 for bradycardia  Postoperative hypothyroidism  Continue levothyroxine   Mild hyperlipidemia  Continue statin     VTE Pharmacologic Prophylaxis: VTE Score: 3 Moderate Risk (Score 3-4) - Pharmacological DVT Prophylaxis Ordered: heparin.    Mobility:   Basic Mobility Inpatient Raw Score: 23  JH-HLM Goal: 7: Walk 25 feet or more  JH-HLM Achieved: 7: Walk 25 feet or more  JH-HLM Goal achieved. Continue to encourage appropriate mobility.    Patient Centered Rounds: I performed bedside rounds with nursing staff today.     Current Length of Stay: 5 day(s)  Current Patient Status: Inpatient   Certification Statement: The patient will continue to require additional inpatient hospital stay due to hypoxia , bradycardia  Discharge Plan: Anticipate discharge tomorrow to home.    Code Status: Level 1 - Full Code    Subjective   Denies chest pain or shortness of breath.    Objective :  Temp:  [97 °F (36.1 °C)] 97 °F (36.1 °C)  HR:  [49-52] 52  BP: (148-153)/(66-85) 148/66  SpO2:  [91 %-98 %] 98 %  O2 Device: Nasal cannula  Nasal Cannula O2 Flow Rate (L/min):  [2 L/min] 2 L/min    There is no height or weight on file to calculate BMI.     Input and Output Summary (last 24 hours):     Intake/Output Summary (Last 24 hours) at 3/9/2025 0842  Last data filed at 3/9/2025 0803  Gross per 24 hour   Intake 900 ml   Output --   Net 900 ml       Physical Exam  Gen: NAD, AAOx3, well developed, well nourished  Eyes: EOMI, PERRLA, no scleral icterus  ENMT:  no nasal discharge, no otic discharge, moist mucous membranes  Neck:  Supple  Cardiovascular: Bradycardic, regular  rhythm rhythm, normal S1-S2, no murmurs, rubs, or gallops  Lungs:  Clear to auscultation bilaterally, no wheezes, or rales, or rhonchi  Abdomen:  Positive bowel sounds, soft, nontender, nondistended  Skin:  Intact, no obvious lesions or rashes  Neuro: Cranial nerves 2-12 are intact, non-focal, 5/5 strength in all 4 extremities      Lines/Drains:              Lab Results: I have reviewed the following results:   Results from last 7 days   Lab Units 03/09/25  0650 03/07/25 0451 03/06/25  0401 03/05/25 0445 03/04/25  1708   WBC Thousand/uL 8.09   < > 8.92   < > 16.05*   HEMOGLOBIN g/dL 13.4   < > 13.5   < > 15.4   HEMATOCRIT % 40.7   < > 41.5   < > 47.1*   PLATELETS Thousands/uL 303   < > 227   < > 247   BANDS PCT %  --   --   --   --  4   SEGS PCT %  --   --  77*   < >  --    LYMPHO PCT % 35  --  15   < > 11*   MONO PCT % 6  --  7   < > 16*   EOS PCT % 2  --  0   < > 0    < > = values in this interval not displayed.     Results from last 7 days   Lab Units 03/09/25  0650 03/06/25 0401 03/05/25 0445   SODIUM mmol/L 137   < > 138   POTASSIUM mmol/L 3.7   < > 3.0*   CHLORIDE mmol/L 100   < > 100   CO2 mmol/L 29   < > 29   BUN mg/dL 11   < > 35*   CREATININE mg/dL 0.49*   < > 0.61   ANION GAP mmol/L 8   < > 9   CALCIUM mg/dL 9.0   < > 8.8   ALBUMIN g/dL  --   --  3.9   TOTAL BILIRUBIN mg/dL  --   --  0.64   ALK PHOS U/L  --   --  59   ALT U/L  --   --  24   AST U/L  --   --  44*   GLUCOSE RANDOM mg/dL 114   < > 122    < > = values in this interval not displayed.     Results from last 7 days   Lab Units 03/04/25  1731   INR  1.06             Results from last 7 days   Lab Units 03/08/25  0555 03/07/25 0451 03/05/25 0445 03/04/25  1731   LACTIC ACID mmol/L  --   --   --  1.4   PROCALCITONIN ng/ml 0.09 0.14 0.51* 0.49*       Recent Cultures (last 7 days):   Results from last 7 days   Lab Units 03/06/25  1650 03/06/25  1453 03/04/25  1731   BLOOD CULTURE   --  No Growth at 48 hrs.  No Growth at 48 hrs. No Growth After  4 Days.  Streptococcus parasanguinis*   GRAM STAIN RESULT   --   --  Gram positive cocci in pairs and chains*   LEGIONELLA URINARY ANTIGEN  Negative  --   --          Last 24 Hours Medication List:     Current Facility-Administered Medications:     acetaminophen (TYLENOL) tablet 650 mg, Q6H PRN    albuterol (PROVENTIL HFA,VENTOLIN HFA) inhaler 2 puff, Q4H PRN    albuterol inhalation solution 2.5 mg, Q6H PRN    atenolol (TENORMIN) tablet 50 mg, Daily    atorvastatin (LIPITOR) tablet 20 mg, Daily With Dinner    cefTRIAXone (ROCEPHIN) IVPB (premix in dextrose) 2,000 mg 50 mL, Q24H, Last Rate: 2,000 mg (03/08/25 1938)    guaiFENesin (MUCINEX) 12 hr tablet 600 mg, Q12H JERAD    heparin (porcine) subcutaneous injection 5,000 Units, Q8H JERAD    hydroCHLOROthiazide tablet 25 mg, Daily    Hydrocod Sharath-Chlorphe Sharath ER (TUSSIONEX) ER suspension 5 mL, Q12H PRN    ipratropium (ATROVENT) 0.02 % inhalation solution 0.5 mg, TID    levalbuterol (XOPENEX) inhalation solution 1.25 mg, TID    levothyroxine tablet 125 mcg, Early Morning    ondansetron (ZOFRAN) injection 4 mg, Q6H PRN    oseltamivir (TAMIFLU) capsule 75 mg, Q12H JERAD    Administrative Statements   Today, Patient Was Seen By: Charan Diallo MD      **Please Note: This note may have been constructed using a voice recognition system.**

## 2025-03-09 NOTE — PLAN OF CARE
Problem: PAIN - ADULT  Goal: Verbalizes/displays adequate comfort level or baseline comfort level  Description: Interventions:  - Encourage patient to monitor pain and request assistance  - Assess pain using appropriate pain scale  - Administer analgesics based on type and severity of pain and evaluate response  - Implement non-pharmacological measures as appropriate and evaluate response  - Consider cultural and social influences on pain and pain management  - Notify physician/advanced practitioner if interventions unsuccessful or patient reports new pain  Outcome: Progressing     Problem: INFECTION - ADULT  Goal: Absence or prevention of progression during hospitalization  Description: INTERVENTIONS:  - Assess and monitor for signs and symptoms of infection  - Monitor lab/diagnostic results  - Monitor all insertion sites, i.e. indwelling lines, tubes, and drains  - Monitor endotracheal if appropriate and nasal secretions for changes in amount and color  - Twilight appropriate cooling/warming therapies per order  - Administer medications as ordered  - Instruct and encourage patient and family to use good hand hygiene technique  - Identify and instruct in appropriate isolation precautions for identified infection/condition  Outcome: Progressing  Goal: Absence of fever/infection during neutropenic period  Description: INTERVENTIONS:  - Monitor WBC    Outcome: Progressing    Goal: Maintains/Returns to pre admission functional level  Description: INTERVENTIONS:  - Perform AM-PAC 6 Click Basic Mobility/ Daily Activity assessment daily.  - Set and communicate daily mobility goal to care team and patient/family/caregiver.   - Collaborate with rehabilitation services on mobility goals if consulted  - Perform Range of Motion 3 times a day.  - Reposition patient every 3 hours.  - Dangle patient 3 times a day  - Stand patient 3 times a day  - Ambulate patient 3 times a day  - Out of bed to chair 3 times a day   - Out of  bed for meals 3 times a day  - Out of bed for toileting  - Record patient progress and toleration of activity level   Outcome: Progressing     Problem: DISCHARGE PLANNING  Goal: Discharge to home or other facility with appropriate resources  Description: INTERVENTIONS:  - Identify barriers to discharge w/patient and caregiver  - Arrange for needed discharge resources and transportation as appropriate  - Identify discharge learning needs (meds, wound care, etc.)  - Arrange for interpretive services to assist at discharge as needed  - Refer to Case Management Department for coordinating discharge planning if the patient needs post-hospital services based on physician/advanced practitioner order or complex needs related to functional status, cognitive ability, or social support system  Outcome: Progressing     Problem: Knowledge Deficit  Goal: Patient/family/caregiver demonstrates understanding of disease process, treatment plan, medications, and discharge instructions  Description: Complete learning assessment and assess knowledge base.  Interventions:  - Provide teaching at level of understanding  - Provide teaching via preferred learning methods  Outcome: Progressing     Problem: Nutrition/Hydration-ADULT  Goal: Nutrient/Hydration intake appropriate for improving, restoring or maintaining nutritional needs  Description: Monitor and assess patient's nutrition/hydration status for malnutrition. Collaborate with interdisciplinary team and initiate plan and interventions as ordered.  Monitor patient's weight and dietary intake as ordered or per policy. Utilize nutrition screening tool and intervene as necessary. Determine patient's food preferences and provide high-protein, high-caloric foods as appropriate.     INTERVENTIONS:  - Monitor oral intake, urinary output, labs, and treatment plans  - Assess nutrition and hydration status and recommend course of action  - Evaluate amount of meals eaten  - Assist patient with  eating if necessary   - Allow adequate time for meals  - Recommend/ encourage appropriate diets, oral nutritional supplements, and vitamin/mineral supplements  - Order, calculate, and assess calorie counts as needed  - Recommend, monitor, and adjust tube feedings and TPN/PPN based on assessed needs  - Assess need for intravenous fluids  - Provide specific nutrition/hydration education as appropriate  - Include patient/family/caregiver in decisions related to nutrition  Outcome: Progressing     Problem: RESPIRATORY - ADULT  Goal: Achieves optimal ventilation and oxygenation  Description: INTERVENTIONS:  - Assess for changes in respiratory status  - Assess for changes in mentation and behavior  - Position to facilitate oxygenation and minimize respiratory effort  - Oxygen administered by appropriate delivery if ordered  - Initiate smoking cessation education as indicated  - Encourage broncho-pulmonary hygiene including cough, deep breathe, Incentive Spirometry  - Assess the need for suctioning and aspirate as needed  - Assess and instruct to report SOB or any respiratory difficulty  - Respiratory Therapy support as indicated  Outcome: Progressing

## 2025-03-09 NOTE — ASSESSMENT & PLAN NOTE
88% on RA   Was requiring 3 L NC, currently on 2 L NC  Suspect hypoxia in setting of Influenza A, bronchitis  Receiving tamiflu, IV ceftriaxone   Home O2 eval

## 2025-03-09 NOTE — ASSESSMENT & PLAN NOTE
Alphahemolytic Streptococcus, 1 of 2 sets -suspicion for skin contaminant  Continue ceftriaxone for now  Repeat BCxs NG x 48 hours

## 2025-03-10 ENCOUNTER — TELEPHONE (OUTPATIENT)
Dept: FAMILY MEDICINE CLINIC | Facility: CLINIC | Age: 65
End: 2025-03-10

## 2025-03-10 ENCOUNTER — TRANSITIONAL CARE MANAGEMENT (OUTPATIENT)
Dept: FAMILY MEDICINE CLINIC | Facility: CLINIC | Age: 65
End: 2025-03-10

## 2025-03-10 VITALS
HEART RATE: 60 BPM | SYSTOLIC BLOOD PRESSURE: 142 MMHG | TEMPERATURE: 98.4 F | RESPIRATION RATE: 18 BRPM | DIASTOLIC BLOOD PRESSURE: 74 MMHG | OXYGEN SATURATION: 92 %

## 2025-03-10 PROCEDURE — 99239 HOSP IP/OBS DSCHRG MGMT >30: CPT | Performed by: HOSPITALIST

## 2025-03-10 PROCEDURE — 94760 N-INVAS EAR/PLS OXIMETRY 1: CPT

## 2025-03-10 PROCEDURE — 94640 AIRWAY INHALATION TREATMENT: CPT

## 2025-03-10 RX ORDER — GUAIFENESIN 600 MG/1
600 TABLET, EXTENDED RELEASE ORAL EVERY 12 HOURS SCHEDULED
Qty: 20 TABLET | Refills: 0 | Status: SHIPPED | OUTPATIENT
Start: 2025-03-10

## 2025-03-10 RX ORDER — LISINOPRIL 10 MG/1
10 TABLET ORAL DAILY
Qty: 30 TABLET | Refills: 0 | Status: SHIPPED | OUTPATIENT
Start: 2025-03-10

## 2025-03-10 RX ADMIN — HYDROCHLOROTHIAZIDE 25 MG: 25 TABLET ORAL at 08:39

## 2025-03-10 RX ADMIN — LEVALBUTEROL HYDROCHLORIDE 1.25 MG: 1.25 SOLUTION RESPIRATORY (INHALATION) at 08:17

## 2025-03-10 RX ADMIN — ATENOLOL 12.5 MG: 25 TABLET ORAL at 08:39

## 2025-03-10 RX ADMIN — LEVOTHYROXINE SODIUM 125 MCG: 100 TABLET ORAL at 05:48

## 2025-03-10 RX ADMIN — GUAIFENESIN 600 MG: 600 TABLET ORAL at 08:40

## 2025-03-10 RX ADMIN — IPRATROPIUM BROMIDE 0.5 MG: 0.5 SOLUTION RESPIRATORY (INHALATION) at 08:17

## 2025-03-10 RX ADMIN — HEPARIN SODIUM 5000 UNITS: 5000 INJECTION INTRAVENOUS; SUBCUTANEOUS at 05:48

## 2025-03-10 NOTE — PLAN OF CARE
Problem: Potential for Falls  Goal: Patient will remain free of falls  Description: INTERVENTIONS:  - Educate patient/family on patient safety including physical limitations  - Instruct patient to call for assistance with activity   - Consult OT/PT to assist with strengthening/mobility   - Keep Call bell within reach  - Keep bed low and locked with side rails adjusted as appropriate  - Keep care items and personal belongings within reach  - Initiate and maintain comfort rounds  - Make Fall Risk Sign visible to staff  - Offer Toileting every 2 Hours, in advance of need  - Initiate/Maintain alarm  - Obtain necessary fall risk management equipment:   - Apply yellow socks and bracelet for high fall risk patients  - Consider moving patient to room near nurses station  Outcome: Progressing     Problem: PAIN - ADULT  Goal: Verbalizes/displays adequate comfort level or baseline comfort level  Description: Interventions:  - Encourage patient to monitor pain and request assistance  - Assess pain using appropriate pain scale  - Administer analgesics based on type and severity of pain and evaluate response  - Implement non-pharmacological measures as appropriate and evaluate response  - Consider cultural and social influences on pain and pain management  - Notify physician/advanced practitioner if interventions unsuccessful or patient reports new pain  Outcome: Progressing     Problem: INFECTION - ADULT  Goal: Absence or prevention of progression during hospitalization  Description: INTERVENTIONS:  - Assess and monitor for signs and symptoms of infection  - Monitor lab/diagnostic results  - Monitor all insertion sites, i.e. indwelling lines, tubes, and drains  - Monitor endotracheal if appropriate and nasal secretions for changes in amount and color  - [place] appropriate cooling/warming therapies per order  - Administer medications as ordered  - Instruct and encourage patient and family to use good hand hygiene  technique  - Identify and instruct in appropriate isolation precautions for identified infection/condition  Outcome: Progressing  Goal: Absence of fever/infection during neutropenic period  Description: INTERVENTIONS:  - Monitor WBC    Outcome: Progressing     Problem: SAFETY ADULT  Goal: Patient will remain free of falls  Description: INTERVENTIONS:  - Educate patient/family on patient safety including physical limitations  - Instruct patient to call for assistance with activity   - Consult OT/PT to assist with strengthening/mobility   - Keep Call bell within reach  - Keep bed low and locked with side rails adjusted as appropriate  - Keep care items and personal belongings within reach  - Initiate and maintain comfort rounds  - Make Fall Risk Sign visible to staff  - Offer Toileting every 2 Hours, in advance of need  - Initiate/Maintain alarm  - Obtain necessary fall risk management equipment:   - Apply yellow socks and bracelet for high fall risk patients  - Consider moving patient to room near nurses station  Outcome: Progressing  Goal: Maintain or return to baseline ADL function  Description: INTERVENTIONS:  -  Assess patient's ability to carry out ADLs; assess patient's baseline for ADL function and identify physical deficits which impact ability to perform ADLs (bathing, care of mouth/teeth, toileting, grooming, dressing, etc.)  - Assess/evaluate cause of self-care deficits   - Assess range of motion  - Assess patient's mobility; develop plan if impaired  - Assess patient's need for assistive devices and provide as appropriate  - Encourage maximum independence but intervene and supervise when necessary  - Involve family in performance of ADLs  - Assess for home care needs following discharge   - Consider OT consult to assist with ADL evaluation and planning for discharge  - Provide patient education as appropriate  Outcome: Progressing  Goal: Maintains/Returns to pre admission functional level  Description:  INTERVENTIONS:  - Perform AM-PAC 6 Click Basic Mobility/ Daily Activity assessment daily.  - Set and communicate daily mobility goal to care team and patient/family/caregiver.   - Collaborate with rehabilitation services on mobility goals if consulted  - Perform Range of Motion 3 times a day.  - Reposition patient every 2 hours.  - Dangle patient 3 times a day  - Stand patient 3 times a day  - Ambulate patient 3 times a day  - Out of bed to chair 3 times a day   - Out of bed for meals 3 times a day  - Out of bed for toileting  - Record patient progress and toleration of activity level   Outcome: Progressing     Problem: DISCHARGE PLANNING  Goal: Discharge to home or other facility with appropriate resources  Description: INTERVENTIONS:  - Identify barriers to discharge w/patient and caregiver  - Arrange for needed discharge resources and transportation as appropriate  - Identify discharge learning needs (meds, wound care, etc.)  - Arrange for interpretive services to assist at discharge as needed  - Refer to Case Management Department for coordinating discharge planning if the patient needs post-hospital services based on physician/advanced practitioner order or complex needs related to functional status, cognitive ability, or social support system  Outcome: Progressing     Problem: Knowledge Deficit  Goal: Patient/family/caregiver demonstrates understanding of disease process, treatment plan, medications, and discharge instructions  Description: Complete learning assessment and assess knowledge base.  Interventions:  - Provide teaching at level of understanding  - Provide teaching via preferred learning methods  Outcome: Progressing     Problem: Nutrition/Hydration-ADULT  Goal: Nutrient/Hydration intake appropriate for improving, restoring or maintaining nutritional needs  Description: Monitor and assess patient's nutrition/hydration status for malnutrition. Collaborate with interdisciplinary team and initiate plan  and interventions as ordered.  Monitor patient's weight and dietary intake as ordered or per policy. Utilize nutrition screening tool and intervene as necessary. Determine patient's food preferences and provide high-protein, high-caloric foods as appropriate.     INTERVENTIONS:  - Monitor oral intake, urinary output, labs, and treatment plans  - Assess nutrition and hydration status and recommend course of action  - Evaluate amount of meals eaten  - Assist patient with eating if necessary   - Allow adequate time for meals  - Recommend/ encourage appropriate diets, oral nutritional supplements, and vitamin/mineral supplements  - Order, calculate, and assess calorie counts as needed  - Recommend, monitor, and adjust tube feedings and TPN/PPN based on assessed needs  - Assess need for intravenous fluids  - Provide specific nutrition/hydration education as appropriate  - Include patient/family/caregiver in decisions related to nutrition  Outcome: Progressing     Problem: RESPIRATORY - ADULT  Goal: Achieves optimal ventilation and oxygenation  Description: INTERVENTIONS:  - Assess for changes in respiratory status  - Assess for changes in mentation and behavior  - Position to facilitate oxygenation and minimize respiratory effort  - Oxygen administered by appropriate delivery if ordered  - Initiate smoking cessation education as indicated  - Encourage broncho-pulmonary hygiene including cough, deep breathe, Incentive Spirometry  - Assess the need for suctioning and aspirate as needed  - Assess and instruct to report SOB or any respiratory difficulty  - Respiratory Therapy support as indicated  Outcome: Progressing

## 2025-03-10 NOTE — ASSESSMENT & PLAN NOTE
Hx of hypothyroidism.   Noted to have HR 40-50s.  Last bloodwork from 7/2023.  -TFTs acceptable  -remained jorge. Stop atenolol .

## 2025-03-10 NOTE — ASSESSMENT & PLAN NOTE
SIRS: Fever (100.6) + WBC 16.05   Procal: 0.49. continue to trend   Lactic WNL   Likely viral sepsis due to Influenza infection:   Possible superimposed bacterial infection  CT chest wo contrast   Diffuse bronchial wall thickening, suggestive of bronchitis or reactive airways disease.  Evaluation of lung parenchyma significantly limited by motion artifact, in particular in the lower lungs. Suggestion of some tree-in-bud nodularity in the right middle lobe, and some tubular opacities in the right lower lobe, likely infectious.  Mild mediastinal lymphadenopathy, likely reactive.  Covid/RSV negative   BC 1/2 sets with alphahemolytic strep, Streptococcus para sanguinous, considerations for skin contaminant.  See above  Repeat BCxs NG

## 2025-03-10 NOTE — DISCHARGE SUMMARY
Discharge Summary - Hospitalist   Name: Joann Khanna 64 y.o. female I MRN: [id number]  Unit/Bed#: -01 I Date of Admission: 3/4/2025   Date of Service: 3/10/2025 I Hospital Day: 6     Assessment & Plan  Influenza A  Cough, congestion, SOB, generalized weakness over the past couple of days  88% on RA. Requiring 3 L NC   Weaned to room air wit O2 sats around 90 - monitor closely may need to place back on O2  Meeting SIRS on admission  Flu A +   Started on Tamiflu, continue  Also started on ceftriaxone with concern for superimposed bacterial infection, elevated procalcitonin.  Procalcitonin now normalized x2.  Had positive blood culture, however, suspect contaminant, repeat BCxs NGTD Reviewed with ID and this is felt to be contaminant given absence of anything such as a prosthetic valve or artificial joint.  Sepsis (HCC)  SIRS: Fever (100.6) + WBC 16.05   Procal: 0.49. continue to trend   Lactic WNL   Likely viral sepsis due to Influenza infection:   Possible superimposed bacterial infection  CT chest wo contrast   Diffuse bronchial wall thickening, suggestive of bronchitis or reactive airways disease.  Evaluation of lung parenchyma significantly limited by motion artifact, in particular in the lower lungs. Suggestion of some tree-in-bud nodularity in the right middle lobe, and some tubular opacities in the right lower lobe, likely infectious.  Mild mediastinal lymphadenopathy, likely reactive.  Covid/RSV negative   BC 1/2 sets with alphahemolytic strep, Streptococcus para sanguinous, considerations for skin contaminant.  See above  Repeat BCxs NG    Bradycardia  Hx of hypothyroidism.   Noted to have HR 40-50s.  Last bloodwork from 7/2023.  -TFTs acceptable  -remained jorge. Stop atenolol .  Positive blood culture  Alphahemolytic Streptococcus, 1 of 2 sets -suspicion for skin contaminant  Continue ceftriaxone for now  Repeat BCxs NG x 48 hours  Acute respiratory failure with hypoxia (HCC)  88% on RA   Was  requiring 3 L NC, currently on 2 L NC  Suspect hypoxia in setting of Influenza A, bronchitis  Home O2 eval -> pt does not qualify  RESOLVED   Hypertension  Home regimen:   HCTZ 25 mg daily   Stop atenolol. Will start lisinopril at dc.  Postoperative hypothyroidism  Continue levothyroxine   Mild hyperlipidemia  Continue statin     Hospital Course:     Joann Khanna is a 64 y.o. female patient who originally presented to the hospital on   Admission Orders (From admission, onward)       Ordered        03/04/25 1918  INPATIENT ADMISSION  Once                         due to   Influenza. Pt  completed tamiflu while here and had slow and steady improvement of respiratory function. She has no acute complaints at dc       Please see above list of diagnoses and related plan for additional information.     Physical Exam:    GEN: No acute distress, comfortable  HEEENT: No JVD, PERRLA, no scleral icterus  RESP: Lungs clear to auscultation bilaterally  CV: RRR, +s1/s2   ABD: SOFT NON TENDER, POSITIVE BOWEL SOUNDS, NO DISTENTION  PSYCH: CALM  NEURO: Ment[place] baseline, NO FOCAL DEFICITS  SKIN: NO RASH  EXTREM: NO EDEMA    CONSULTING PROVIDERS   IP CONSULT TO CASE MANAGEMENT  IP CONSULT TO INFECTIOUS DISEASES    PROCEDURES PERFORMED  * No surgery found *    RADIOLOGY RESULTS  XR chest 2 views  Result Date: 3/5/2025  Narrative: XR CHEST PA AND LATERAL INDICATION: Chest Pain. COMPARISON: None FINDINGS: Prominent bibasilar bronchial markings. No pneumothorax or pleural effusion. Normal cardiomediastinal silhouette. Mild degenerative changes of the spine. Normal upper abdomen.     Impression: Prominent bibasilar bronchial markings may represent bronchitis. See subsequent chest CT report. Workstation performed: [id number]     CT chest without contrast  Result Date: 3/4/2025  Narrative: CT CHEST WITHOUT IV CONTRAST INDICATION: Right-sided pneumonia suspected, equivocal CXR. As per review of electronic medical record, patient with cough,  shortness of breath and hypoxia. COMPARISON: Chest radiograph from earlier the same day. TECHNIQUE: A CT examination of the chest was performed without intravenous contrast. Multiplanar 2D reformatted images were created from the source data. This examination, like all CT scans performed in the [facility], was performed utilizing techniques to minimize radiation dose exposure, including the use of iterative reconstruction and automated exposure control. Radiation dose length product (DLP) for this visit:  784.07 mGy-cm FINDINGS: BRONCHOPULMONARY:  Clear central airways. Diffuse bronchial wall thickening. Evaluation of the lung parenchyma is limited by motion artifact, in particular in the lower lobes. Mild upper lung predominant emphysematous changes. Likely at least some linear  opacities at the lung bases likely represent scarring or linear atelectasis. In the right lower lobe and suggestion of some tree-in-bud nodularity in the right middle lobe. PLEURA:  No pleural effusions. No pneumothorax. HEART/GREAT VESSELS: The heart is at the upper limits of normal in size. No pericardial effusion. Normal caliber thoracic aorta. MEDIASTINUM/LYMPH NODES: Several mildly enlarged mediastinal lymph nodes measuring up to 1.2 cm in short axis in the right lower paratracheal region. Evaluation for hilar lymphadenopathy limited without IV contrast. No axillary lymphadenopathy. CHEST WALL AND LOWER NECK: The patient is status post thyroidectomy. VISUALIZED STRUCTURES IN THE UPPER ABDOMEN:  Unremarkable. MUSCULOSKELETAL:  No focal aggressive osseous lesions. Degenerative changes of the spine.     Impression: 1) Diffuse bronchial wall thickening, suggestive of bronchitis or reactive airways disease. 2) Evaluation of lung parenchyma significantly limited by motion artifact, in particular in the lower lungs. Suggestion of some tree-in-bud nodularity in the right middle lobe, and some tubular opacities in the right  "lower lobe, likely infectious. 3) Mild mediastinal lymphadenopathy, likely reactive. Workstation performed: [id number]       LABS  Results from last 7 days   Lab Units 03/09/25  0650 03/07/25  0451 03/06/25  0401 03/05/25 0445 03/04/25  1731 03/04/25  1708   WBC Thousand/uL 8.09 6.32 8.92 13.12*  --  16.05*   HEMOGLOBIN g/dL 13.4 12.7 13.5 13.8  --  15.4   HEMATOCRIT % 40.7 39.8 41.5 43.0  --  47.1*   MCV fL 89 89 90 90  --  89   TOTAL NEUT ABS Thousand/uL 4.45  --   --   --   --  11.72*   BANDS PCT %  --   --   --   --   --  4   PLATELETS Thousands/uL 303 246 227 234  --  247   INR   --   --   --   --  1.06  --      Results from last 7 days   Lab Units 03/09/25  0650 03/08/25  0555 03/07/25  0451 03/06/25  0401 03/05/25 0445 03/04/25  1708   SODIUM mmol/L 137 140 141 139 138 137   POTASSIUM mmol/L 3.7 3.6 3.4* 3.7 3.0* 3.2*   CHLORIDE mmol/L 100 100 101 102 100 98   CO2 mmol/L 29 34* 31 29 29 29   BUN mg/dL 11 15 18 24 35* 32*   CREATININE mg/dL 0.49* 0.51* 0.49* 0.50* 0.61 0.67   CALCIUM mg/dL 9.0 9.0 8.9 9.0 8.8 9.6   ALBUMIN g/dL  --   --   --   --  3.9 4.4   TOTAL BILIRUBIN mg/dL  --   --   --   --  0.64 1.01*   ALK PHOS U/L  --   --   --   --  59 71   ALT U/L  --   --   --   --  24 27   AST U/L  --   --   --   --  44* 48*   EGFR ml/min/1.73sq m 104 102 104 103 96 93   GLUCOSE RANDOM mg/dL 114 105 107 110 122 125     Results from last 7 days   Lab Units 03/04/25 2031 03/04/25  1708   HS TNI 0HR ng/L  --  35   HS TNI 2HR ng/L 36  --                       Results from last 7 days   Lab Units 03/08/25  0555   TSH 3RD GENERATON uIU/mL 5.974*   FREE T4 ng/dL 0.91     Results from last 7 days   Lab Units 03/08/25  0555 03/05/25  0445 03/04/25  1731   LACTIC ACID mmol/L  --   --  1.4   PROCALCITONIN ng/ml 0.09   < > 0.49*    < > = values in this interval not displayed.           Cultures:         Invalid input(s): \"URIBILINOGEN\"        Results from last 7 days   Lab Units 03/06/25  1650 03/06/25  1453 03/04/25  1731 " 03/04/25  1708   BLOOD CULTURE   --  No Growth at 72 hrs.  No Growth at 72 hrs. No Growth After 5 Days.  Streptococcus parasanguinis*  --    GRAM STAIN RESULT   --   --  Gram positive cocci in pairs and chains*  --    INFLUENZA A PCR   --   --   --  Positive*   LEGIONELLA URINARY ANTIGEN  Negative  --   --   --    STREP PNEUMONIAE ANTIGEN, URINE  Negative  --   --   --          Condition at Discharge:  good      Discharge instructions/Information to patient and family:   See after visit summary for information provided to patient and family.  The above hospital course, results, incidental findings, need for follow up was discussed in detail with the patient and/or family.    Provisions for Follow-Up Care:  See after visit summary for information related to follow-up care and any pertinent home health orders.      Disposition:     Home       Discharge Statement:  I spent 37 minutes discharging the patient. This time was spent on the day of discharge. I had direct contact with the patient on the day of discharge. Greater than 50% of the total time was spent examining patient, answering all patient questions, arranging and discussing plan of care with patient as well as directly providing post-discharge instructions.  Additional time then spent on discharge activities.    Discharge Medications:  See after visit summary for reconciled discharge medications provided to patient and family.      ** Please Note: This note has been constructed using a voice recognition system **

## 2025-03-10 NOTE — UTILIZATION REVIEW
Continued Stay Review    Date: 3/10/25                           Current Patient Class: Inpatient  Current Level of Care: Med surg     HPI:64 y.o. female initially admitted on 3/4/25      Current Diagnosis:Influenza A     Assessment/Plan: Pt completed tamiflu while here and had slow and steady improvement of respiratory function. She has no acute complaints at [facility]. BC's have been negative. She is on room air, did not qualify for home oxygen. Stop atenolol due to bradycardia. HR today improved to 60.  /86, RR 16, 91-92% on room air. Will start lisinopril at IA. DC to home today.     IN today 3/10/25.         Medications:   Scheduled Medications:  atorvastatin, 20 mg, Oral, Daily With Dinner  cefTRIAXone, 2,000 mg, Intravenous, Q24H  guaiFENesin, 600 mg, Oral, Q12H JERAD  heparin (porcine), 5,000 Units, Subcutaneous, Q8H JERAD  hydroCHLOROthiazide, 25 mg, Oral, Daily  ipratropium, 0.5 mg, Nebulization, TID  levalbuterol, 1.25 mg, Nebulization, TID  levothyroxine, 125 mcg, Oral, Early Morning      Continuous IV Infusions: none      PRN Meds:  acetaminophen, 650 mg, Oral, Q6H PRN  albuterol, 2 puff, Inhalation, Q4H PRN  albuterol, 2.5 mg, Nebulization, Q6H PRN  Hydrocod Sharath-Chlorphe Sharath ER, 5 mL, Oral, Q12H PRN  ondansetron, 4 mg, Intravenous, Q6H PRN      Discharge Plan: TBD    Vital Signs (last 3 days)       Date/Time Temp Pulse Resp BP MAP (mmHg) SpO2 Calculated FIO2 (%) - Nasal Cannula O2 Flow Rate (L/min) Nasal Cannula O2 Flow Rate (L/min) O2 Device Patient Position - Orthostatic VS Pain    03/10/25 0819 -- -- -- -- -- 91 % -- -- -- None (Room air) Lying --    03/10/25 08:18:08 98.2 °F (36.8 °C) 60 -- 159/86 110 92 % -- -- -- -- -- --    03/10/25 0817 -- -- -- -- -- 91 % -- -- -- -- -- --    03/10/25 0700 -- 46 -- -- -- -- -- -- -- -- -- --    03/10/25 0300 -- 48 -- -- -- -- -- -- -- -- -- --    03/10/25 0100 -- 49 -- -- -- -- -- -- -- -- -- --    03/09/25 21:32:54 98.1 °F (36.7 °C) 56 16 150/70 97 89 % -- -- --  -- -- --    03/09/25 2118 -- -- -- -- -- 90 % 28 -- 2 L/min Nasal cannula -- No Pain    03/09/25 1922 -- 63 18 -- -- 90 % -- -- -- Nasal cannula -- --    03/09/25 15:58:24 98.1 °F (36.7 °C) 59 18 143/80 101 93 % -- -- -- -- Sitting --    03/09/25 1534 -- -- -- -- -- 92 % -- -- -- None (Room air) -- --    03/09/25 0900 -- 60 -- -- -- -- -- -- -- -- -- --    03/09/25 0852 -- -- -- -- -- 94 % 28 -- 2 L/min Nasal cannula -- No Pain    03/09/25 08:03:59 97.8 °F (36.6 °C) 60 18 148/66 93 98 % -- -- -- -- -- --    03/09/25 0751 -- -- -- -- -- -- 28 -- 2 L/min Nasal cannula -- --    03/09/25 0500 -- 43 -- -- -- -- -- -- -- -- -- --    03/09/25 0400 -- -- -- -- -- -- 28 -- 2 L/min Nasal cannula -- No Pain    03/09/25 0100 -- 45 -- -- -- -- -- -- -- -- -- --    03/08/25 2300 -- 45 -- -- -- -- -- -- -- -- -- --    03/08/25 1959 -- -- -- -- -- 91 % 28 -- 2 L/min Nasal cannula -- --    03/08/25 14:55:01 -- 49 -- 153/85 108 91 % -- -- -- -- -- --    03/08/25 1409 -- -- -- -- -- 96 % -- -- -- None (Room air) -- --    03/08/25 0838 -- -- -- -- -- 93 % -- -- -- None (Room air) -- No Pain    03/08/25 08:37:02 -- 63 -- 134/64 87 91 % -- -- -- -- -- --    03/08/25 0837 -- 63 -- 134/64 -- -- -- -- -- -- -- --    03/08/25 0750 -- -- -- -- -- 91 % -- -- -- None (Room air) -- --    03/07/25 22:42:47 97.9 °F (36.6 °C) 55 18 154/81 105 94 % -- -- -- -- -- --    03/07/25 2100 -- -- -- -- -- -- -- -- -- None (Room air) -- No Pain    03/07/25 1913 -- -- -- -- -- -- -- -- -- None (Room air) -- --    03/07/25 1911 -- -- -- -- -- -- 28 2 L/min 2 L/min Nasal cannula -- --    03/07/25 17:21:47 97.9 °F (36.6 °C) 57 -- 125/73 90 90 % -- -- -- -- -- --    03/07/25 1351 -- -- -- -- -- 94 % 28 -- 2 L/min Nasal cannula -- --    03/07/25 0845 -- -- -- -- -- 92 % 28 -- 2 L/min Nasal cannula -- No Pain    03/07/25 08:02:34 97.9 °F (36.6 °C) 55 -- 130/60 83 92 % -- -- -- -- -- --    03/07/25 0710 -- -- -- -- -- 93 % 28 -- 2 L/min Nasal cannula -- --           Weight (last 2 days)       None            Pertinent Labs/Diagnostic Results:   Radiology:  CT chest without contrast   Final Interpretation by Nayla Gillespie MD (03/04 2086)      1) Diffuse bronchial wall thickening, suggestive of bronchitis or reactive airways disease.      2) Evaluation of lung parenchyma significantly limited by motion artifact, in particular in the lower lungs. Suggestion of some tree-in-bud nodularity in the right middle lobe, and some tubular opacities in the right lower lobe, likely infectious.      3) Mild mediastinal lymphadenopathy, likely reactive.                           Workstation performed: [id number]         XR chest 2 views   Final Interpretation by Ry Peraza MD (03/05 0728)      Prominent bibasilar bronchial markings may represent bronchitis. See subsequent chest CT report.            Workstation performed: [id number]           Cardiology:  ECG 12 lead   Final Result by Onel Randhawa MD (03/05 1214)   Normal sinus rhythm   Nonspecific ST and T wave abnormality   Abnormal ECG   No previous ECGs available   Confirmed by Onel Randhawa ([id number]) on 3/5/2025 2:58:15 PM          Results from last 7 days   Lab Units 03/04/25  1708   SARS-COV-2  Negative     Results from last 7 days   Lab Units 03/09/25  0650 03/07/25  0451 03/06/25  0401 03/05/25  0445 03/04/25  1708   WBC Thousand/uL 8.09 6.32 8.92 13.12* 16.05*   HEMOGLOBIN g/dL 13.4 12.7 13.5 13.8 15.4   HEMATOCRIT % 40.7 39.8 41.5 43.0 47.1*   PLATELETS Thousands/uL 303 246 227 234 247   TOTAL NEUT ABS Thousands/µL  --   --  6.86 11.33*  --    BANDS PCT %  --   --   --   --  4         Results from last 7 days   Lab Units 03/09/25  0650 03/08/25  0555 03/07/25  0451 03/06/25  0401 03/05/25  0445   SODIUM mmol/L 137 140 141 139 138   POTASSIUM mmol/L 3.7 3.6 3.4* 3.7 3.0*   CHLORIDE mmol/L 100 100 101 102 100   CO2 mmol/L 29 34* 31 29 29   ANION GAP mmol/L 8 6 9 8 9   BUN mg/dL 11 15 18 24 35*   CREATININE  mg/dL 0.49* 0.51* 0.49* 0.50* 0.61   EGFR ml/min/1.73sq m 104 102 104 103 96   CALCIUM mg/dL 9.0 9.0 8.9 9.0 8.8   MAGNESIUM mg/dL  --   --  2.3  --  2.4     Results from last 7 days   Lab Units 03/05/25  0445 03/04/25  1708   AST U/L 44* 48*   ALT U/L 24 27   ALK PHOS U/L 59 71   TOTAL PROTEIN g/dL 7.1 7.7   ALBUMIN g/dL 3.9 4.4   TOTAL BILIRUBIN mg/dL 0.64 1.01*               Results from last 7 days   Lab Units 03/04/25  2031 03/04/25  1708   HS TNI 0HR ng/L  --  35   HS TNI 2HR ng/L 36  --    HSTNI D2 ng/L 1  --          Results from last 7 days   Lab Units 03/04/25  1731   PROTIME seconds 14.3   INR  1.06   PTT seconds 34     Results from last 7 days   Lab Units 03/08/25  0555   TSH 3RD GENERATON uIU/mL 5.974*     Results from last 7 days   Lab Units 03/08/25  0555 03/07/25  0451 03/05/25  0445 03/04/25  1731   PROCALCITONIN ng/ml 0.09 0.14 0.51* 0.49*     Results from last 7 days   Lab Units 03/04/25  1731   LACTIC ACID mmol/L 1.4             Results from last 7 days   Lab Units 03/06/25  1650 03/04/25  1708   STREP PNEUMONIAE ANTIGEN, URINE  Negative  --    LEGIONELLA URINARY ANTIGEN  Negative  --    INFLUENZA A PCR   --  Positive*   INFLUENZA B PCR   --  Negative   RSV PCR   --  Negative                             Results from last 7 days   Lab Units 03/06/25  1453 03/04/25  1731   BLOOD CULTURE  No Growth at 72 hrs.  No Growth at 72 hrs. No Growth After 5 Days.  Streptococcus parasanguinis*   GRAM STAIN RESULT   --  Gram positive cocci in pairs and chains*                   Network Utilization Review Department  ATTENTION: Please call with any questions or concerns to [phone] and carefully listen to the prompts so that you are directed to the right person. All voicemails are confidential.   For Discharge needs, contact Care Management DC Support Team at [phone] opt. 2  Send all requests for admission clinical reviews, approved or denied determinations and any other requests to dedicated fax  number below belonging to the campus where the patient is receiving treatment. List of dedicated fax numbers for the Facilities:  FACILITY NAME UR FAX NUMBER   ADMISSION DENIALS (Administrative/Medical Necessity) [phone]   DISCHARGE SUPPORT TEAM (NETWORK) [phone]   PARENT CHILD HEALTH (Maternity/NICU/Pediatrics) [phone]   [facility] [phone]   [facility] [phone]   [facility] [phone]   [facility] [phone]   [facility] [phone]   [facility] [phone]   [facility] [phone]   [facility] [phone]   [facility] [phone]   [facility] [phone]   General acute hospital [phone]   [facility] [phone]

## 2025-03-10 NOTE — ASSESSMENT & PLAN NOTE
88% on RA   Was requiring 3 L NC, currently on 2 L NC  Suspect hypoxia in setting of Influenza A, bronchitis  Home O2 eval -> pt does not qualify  RESOLVED

## 2025-03-10 NOTE — PLAN OF CARE
Problem: Potential for Falls  Goal: Patient will remain free of falls  Description: INTERVENTIONS:  - Educate patient/family on patient safety including physical limitations  - Instruct patient to call for assistance with activity   - Consult OT/PT to assist with strengthening/mobility   - Keep Call bell within reach  - Keep bed low and locked with side rails adjusted as appropriate  - Keep care items and personal belongings within reach  - Initiate and maintain comfort rounds  - Make Fall Risk Sign visible to staff  - Offer Toileting every 2 Hours, in advance of need  - Initiate/Maintain bed alarm  - Obtain necessary fall risk management equipment:    Problem: PAIN - ADULT  Goal: Verbalizes/displays adequate comfort level or baseline comfort level  Description: Interventions:  - Encourage patient to monitor pain and request assistance  - Assess pain using appropriate pain scale  - Administer analgesics based on type and severity of pain and evaluate response  - Implement non-pharmacological measures as appropriate and evaluate response  - Consider cultural and social influences on pain and pain management  - Notify physician/advanced practitioner if interventions unsuccessful or patient reports new pain  Outcome: Progressing     Problem: RESPIRATORY - ADULT  Goal: Achieves optimal ventilation and oxygenation  Description: INTERVENTIONS:  - Assess for changes in respiratory status  - Assess for changes in mentation and behavior  - Position to facilitate oxygenation and minimize respiratory effort  - Oxygen administered by appropriate delivery if ordered  - Initiate smoking cessation education as indicated  - Encourage broncho-pulmonary hygiene including cough, deep breathe, Incentive Spirometry  - Assess the need for suctioning and aspirate as needed  - Assess and instruct to report SOB or any respiratory difficulty  - Respiratory Therapy support as indicated  Outcome: Progressing     - Apply yellow socks and  bracelet for high fall risk patients  - Consider moving patient to room near nurses station  Outcome: Progressing

## 2025-03-10 NOTE — ASSESSMENT & PLAN NOTE
Cough, congestion, SOB, generalized weakness over the past couple of days  88% on RA. Requiring 3 L NC   Weaned to room air wit O2 sats around 90 - monitor closely may need to place back on O2  Meeting SIRS on admission  Flu A +   Started on Tamiflu, continue  Also started on ceftriaxone with concern for superimposed bacterial infection, elevated procalcitonin.  Procalcitonin now normalized x2.  Had positive blood culture, however, suspect contaminant, repeat BCxs NGTD Reviewed with ID and this is felt to be contaminant given absence of anything such as a prosthetic valve or artificial joint.

## 2025-03-10 NOTE — TELEPHONE ENCOUNTER
Facility admitted to:   [facility]   Date of admission:   3/4   Date of discharge:   3/10   How are you feeling?   Doing fine   Were you able to  any ordered medications? Yes - but needs albuterol for her nebulizer     Advise patient to call the office or head back to the hospital with any new or worsening symptoms.     Is there any way someone can send a script for her nebulizer solution due to no script was sent from the hospital?

## 2025-03-11 NOTE — UTILIZATION REVIEW
NOTIFICATION OF ADMISSION DISCHARGE   This is a Notification of Discharge from [facility]. Please be advised that this patient has been discharge from our facility. Below you will find the admission and discharge date and time including the patient’s disposition.   UTILIZATION REVIEW CONTACT:  Nerissa Brito  Utilization   Network Utilization Review Department  Phone: [phone] x carefully listen to the prompts. All voicemails are confidential.  Email: [email or web address]     ADMISSION INFORMATION  PRESENTATION DATE: 3/4/2025  5:05 PM  OBERVATION ADMISSION DATE: N/A  INPATIENT ADMISSION DATE: 3/4/25  7:18 PM   DISCHARGE DATE: 3/10/2025 12:39 PM   DISPOSITION:Home/Self Care    Network Utilization Review Department  ATTENTION: Please call with any questions or concerns to [phone] and carefully listen to the prompts so that you are directed to the right person. All voicemails are confidential.   For Discharge needs, contact Care Management DC Support Team at [phone] opt. 2  Send all requests for admission clinical reviews, approved or denied determinations and any other requests to dedicated fax number below belonging to the campus where the patient is receiving treatment. List of dedicated fax numbers for the Facilities:  FACILITY NAME UR FAX NUMBER   ADMISSION DENIALS (Administrative/Medical Necessity) [phone]   DISCHARGE SUPPORT TEAM ([facility]) [phone]   PARENT CHILD HEALTH (Maternity/NICU/Pediatrics) [phone]   [facility] [phone]   [facility] [phone]   [facility] [phone]   [facility] [phone]   [facility] [phone]   [facility] [phone]   [facility] [phone]   [facility] [phone]    [facility] [phone]   [facility] [phone]   [facility] [phone]   [facility] [phone]

## 2025-03-12 ENCOUNTER — TELEPHONE (OUTPATIENT)
Age: 65
End: 2025-03-12

## 2025-03-12 LAB
BACTERIA BLD CULT: NORMAL
BACTERIA BLD CULT: NORMAL

## 2025-03-12 NOTE — TELEPHONE ENCOUNTER
Patient called back requesting to speak to someone in regards to her concerns about her blood pressure. She just checked it a few minutes ago and it was 143/95. Please advise

## 2025-03-12 NOTE — TELEPHONE ENCOUNTER
I spoke to patient and she was concerned about her blood pressure readings since the hospital took her off of Atenlol 50 mg and put her on lisinipril 10 mg. Her bp was 10 /97, 11AM 175/94, 12:45 /107,1:33PM 143/95. Patient is feeling fine and will be in to see you tomorrow for a TCM.    I advised patient if her b/p goes up again and she doesn't feel good to go to ER.Patient agreed.

## 2025-03-12 NOTE — TELEPHONE ENCOUNTER
Patient called stating while in the hospital they took her off the Atenolol and started her on Lisinopril on Monday. She is concerned because her blood pressure has been high today; 154/90, 160/85, 182/77, 175/94 and 151/100 - she is not having any other symptoms but would like to speak with someone. Please call and advise.

## 2025-03-13 ENCOUNTER — OFFICE VISIT (OUTPATIENT)
Dept: FAMILY MEDICINE CLINIC | Facility: CLINIC | Age: 65
End: 2025-03-13
Payer: COMMERCIAL

## 2025-03-13 VITALS
WEIGHT: 209 LBS | TEMPERATURE: 98.5 F | OXYGEN SATURATION: 95 % | HEIGHT: 66 IN | BODY MASS INDEX: 33.59 KG/M2 | DIASTOLIC BLOOD PRESSURE: 78 MMHG | SYSTOLIC BLOOD PRESSURE: 124 MMHG | RESPIRATION RATE: 17 BRPM | HEART RATE: 68 BPM

## 2025-03-13 DIAGNOSIS — I10 PRIMARY HYPERTENSION: ICD-10-CM

## 2025-03-13 DIAGNOSIS — R78.81 POSITIVE BLOOD CULTURE: ICD-10-CM

## 2025-03-13 DIAGNOSIS — E87.6 HYPOKALEMIA: ICD-10-CM

## 2025-03-13 DIAGNOSIS — E78.5 MILD HYPERLIPIDEMIA: ICD-10-CM

## 2025-03-13 DIAGNOSIS — Z87.09 HISTORY OF INFLUENZA: Primary | ICD-10-CM

## 2025-03-13 DIAGNOSIS — F17.200 NICOTINE DEPENDENCE, UNCOMPLICATED, UNSPECIFIED NICOTINE PRODUCT TYPE: ICD-10-CM

## 2025-03-13 DIAGNOSIS — E89.0 POSTOPERATIVE HYPOTHYROIDISM: ICD-10-CM

## 2025-03-13 DIAGNOSIS — E05.00 GRAVES' DISEASE WITH EXOPHTHALMOS: ICD-10-CM

## 2025-03-13 PROBLEM — A41.9 SEPSIS (HCC): Status: RESOLVED | Noted: 2025-03-04 | Resolved: 2025-03-13

## 2025-03-13 PROBLEM — J96.01 ACUTE RESPIRATORY FAILURE WITH HYPOXIA (HCC): Status: RESOLVED | Noted: 2025-03-04 | Resolved: 2025-03-13

## 2025-03-13 PROCEDURE — 99496 TRANSJ CARE MGMT HIGH F2F 7D: CPT | Performed by: FAMILY MEDICINE

## 2025-03-13 NOTE — ASSESSMENT & PLAN NOTE
The patient's blood pressure reading was good in the office today.  She was just started on the lisinopril on March 10, 2025 and I believe she is still adjusting to the medication.  I advised her to stop checking her blood pressure so often at home since there will be some fluctuation.  I did advise her to check it once daily in the morning before she gets her day started and she can call us if the reading is consistently greater than 150/90.  In addition, I advised her to go for the follow-up lab work as indicated.  We will see her back in the office as scheduled.  Orders:  •  Comprehensive metabolic panel; Future  •  CBC and differential; Future  •  LDL cholesterol, direct; Future  •  Lipid panel; Future

## 2025-03-13 NOTE — PROGRESS NOTES
Transition of Care Visit  Name: Joann Khanna      : 1960      MRN: [id number]  Encounter Provider: Morelia Castillo DO  Encounter Date: 3/13/2025   Encounter department: [facility]    Assessment & Plan  History of influenza  The patient is feeling much better after her hospitalization.  She is still very tired but is improving.  She will continue to stay well-hydrated and rest.  We will follow along with her very closely.  We will see her back as scheduled.       Primary hypertension  The patient's blood pressure reading was good in the office today.  She was just started on the lisinopril on March 10, 2025 and I believe she is still adjusting to the medication.  I advised her to stop checking her blood pressure so often at home since there will be some fluctuation.  I did advise her to check it once daily in the morning before she gets her day started and she can call us if the reading is consistently greater than 150/90.  In addition, I advised her to go for the follow-up lab work as indicated.  We will see her back in the office as scheduled.  Orders:  •  Comprehensive metabolic panel; Future  •  CBC and differential; Future  •  LDL cholesterol, direct; Future  •  Lipid panel; Future    Postoperative hypothyroidism  The patient will continue on the current dose of her levothyroxine.  We will continue to monitor her very closely.  Her thyroid levels checked in the hospital were in the normal range.  We will see her back as scheduled.       Graves' disease with exophthalmos  The patient is encouraged to follow-up regularly with her ophthalmologist.       Nicotine dependence, uncomplicated, unspecified nicotine product type  The patient is strongly encouraged to quit smoking.  She refused nicotine replacement while in the hospital and has weaned down considerably.  She will continue to work on trying to quit.  We will see her back as scheduled.       Positive blood  culture  Positive blood culture while in the hospital that most likely was related to contamination.  She has been doing fine since then.       Mild hyperlipidemia  The patient will continue with the current dose of her atorvastatin.  We have made no changes today.  Orders:  •  Comprehensive metabolic panel; Future  •  CBC and differential; Future  •  LDL cholesterol, direct; Future  •  Lipid panel; Future    Hypokalemia  The patient did have a low potassium level while in the hospital.  We will send her for the follow-up lab work as indicated and follow-up with results.  Orders:  •  Comprehensive metabolic panel; Future      Tobacco Cessation Counseling: Tobacco cessation counseling was provided. The patient is sincerely urged to quit consumption of tobacco. She is ready to quit tobacco.       Chief Complaint   Patient presents with   • Transition of Care Management     Pt admitted to [facility] 3/4-3/10 with flu A/sepsis. Pt reports they changed meds in hospital - stopped atenolol 50 mg and started lisinopril. HR was low - in 30s at night.        History of Present Illness     Transitional Care Management Review:   Joann Khanna is a 65 y.o. female here for TCM follow up.     During the TCM phone call patient stated:  TCM Call (since 3/3/2025)     Date and time call was made  3/10/2025  3:07 PM    Hospital care reviewed  Records reviewed    Patient was hospitialized at  [facility]    Date of Admission  03/04/25    Date of discharge  03/10/25    Diagnosis  Influenza A    Disposition  Home    Were the patients medications reviewed and updated  Yes    Current Symptoms  None      TCM Call (since 3/3/2025)     Post hospital issues  None    Should patient be enrolled in anticoag monitoring?  No    Scheduled for follow up?  Yes    Did you obtain your prescribed medications  Yes    Do you need help managing your prescriptions or medications  No    Is transportation to your appointment needed  No    I have advised  the patient to call PCP with any new or worsening symptoms  Melissa Rocio FRANKLYN Khanna is a 64 y.o. female who presents today for transitional care management visit after recent hospitalization at [facility] from March 4, 2025 until March 10, 2025 where she was hospitalized for sepsis related to influenza A , acute respiratory failure with hypoxia, and bradycardia.  She was sick for a few days prior and was not eating or drinking and disoriented.  She has a history of hypertension, postoperative hypothyroidism, and hyperlipidemia.  She presented initially to the hospital with shortness of breath and was found to have influenza A.  She had hypoxia at 88% on room air.  She was started on 3 L of nasal cannula.  She was started on Tamiflu during her hospitalization.  She did have a blood culture that was positive however it was determined to possibly be contaminant.  She was determined to have had likely viral sepsis due to an influenza infection.  She underwent many tests including a CT scan of the chest which demonstrated diffuse bronchial wall thickening suggestive of bronchitis or reactive airway disease and to be opacities in the right lower lobe mostly infectious.  Also during the course of her hospitalization she was found to be bradycardic with her heart rate in the 40s to 50s.  She was taken off atenolol and was placed on lisinopril for her blood pressure.  She is concerned     She was getting readings of 150/90 on average.  Her diastolic is 85-90  Her heart rate is in the 60s on average.    BP last night was 140/90    BP was 156/90.  She is checking her BP multiple times a day.   She has cut down on her smoking to one per day and is weaning off the medication.      She has a lingering cough and is not as tight.  She feels heart beating at times when anxious.  There are no fevers. There is no diarrhea and vomiting.      She is a  at [employer]- she works nights. She wants to go  "back to work part time. 8 hrs normal .      Review of Systems   Constitutional:  Positive for fatigue.   HENT: Negative.     Eyes: Negative.    Respiratory:  Positive for cough. Negative for apnea, choking, chest tightness, shortness of breath, wheezing and stridor.    Cardiovascular: Negative.  Negative for chest pain, palpitations and leg swelling.   Gastrointestinal: Negative.  Negative for abdominal distention, abdominal pain, anal bleeding, blood in stool, constipation, diarrhea, nausea, rectal pain and vomiting.   Endocrine: Negative.    Genitourinary: Negative.    Musculoskeletal: Negative.    Skin: Negative.    Allergic/Immunologic: Negative.    Neurological: Negative.    Hematological: Negative.    Psychiatric/Behavioral: Negative.       Objective   /78   Pulse 68   Temp 98.5 °F (36.9 °C) (Tympanic)   Resp 17   Ht 5' 6\" (1.676 m)   Wt 94.8 kg (209 lb)   SpO2 95%   BMI 33.73 kg/m²     Physical Exam  Constitutional:       General: She is not in acute distress.     Appearance: Normal appearance. She is well-developed. She is not diaphoretic.   HENT:      Head: Normocephalic and atraumatic.      Right Ear: Tympanic membrane, ear canal and external ear normal.      Left Ear: Tympanic membrane, ear canal and external ear normal.      Nose: Nose normal.      Mouth/Throat:      Mouth: Mucous membranes are moist.      Pharynx: Oropharynx is clear. No oropharyngeal exudate.   Eyes:      General: No scleral icterus.        Right eye: No discharge.         Left eye: No discharge.      Extraocular Movements: Extraocular movements intact.      Pupils: Pupils are equal, round, and reactive to light.   Neck:      Thyroid: No thyromegaly.      Vascular: No JVD.      Trachea: No tracheal deviation.   Cardiovascular:      Rate and Rhythm: Normal rate and regular rhythm.      Heart sounds: Normal heart sounds. No murmur heard.     No friction rub. No gallop.   Pulmonary:      Effort: Pulmonary effort is normal. No " respiratory distress.      Breath sounds: Normal breath sounds. No wheezing or rales.   Chest:      Chest wall: No tenderness.   Abdominal:      General: Bowel sounds are normal. There is no distension.      Palpations: Abdomen is soft. There is no mass.      Tenderness: There is no abdominal tenderness. There is no guarding or rebound.      Hernia: No hernia is present.   Musculoskeletal:         General: No tenderness or deformity. Normal range of motion.      Cervical back: Normal range of motion and neck supple.   Lymphadenopathy:      Cervical: No cervical adenopathy.   Skin:     General: Skin is warm and dry.      Coloration: Skin is not pale.      Findings: No erythema or rash.   Neurological:      Mental Status: She is alert and oriented to person, place, and time.      Cranial Nerves: No cranial nerve deficit.      Sensory: No sensory deficit.      Motor: No abnormal muscle tone.      Coordination: Coordination normal.      Deep Tendon Reflexes: Reflexes normal.   Psychiatric:         Mood and Affect: Mood normal.         Behavior: Behavior normal.         Thought Content: Thought content normal.         Judgment: Judgment normal.       Medications have been reviewed by provider in current encounter    Administrative Statements   I have spent a total time of 40 minutes in caring for this patient on the day of the visit/encounter including Prognosis, Risks and benefits of tx options, Instructions for management, Patient and family education, Importance of tx compliance, Risk factor reductions, Impressions, Counseling / Coordination of care, Documenting in the medical record, Reviewing/placing orders in the medical record (including tests, medications, and/or procedures), and Obtaining or reviewing history  .

## 2025-03-13 NOTE — ASSESSMENT & PLAN NOTE
The patient is strongly encouraged to quit smoking.  She refused nicotine replacement while in the hospital and has weaned down considerably.  She will continue to work on trying to quit.  We will see her back as scheduled.

## 2025-03-13 NOTE — LETTER
March 13, 2025     Patient: Joann Khanna  YOB: 1960  Date of Visit: 3/13/2025      To Whom it May Concern:    Joann Khanna is under my professional care. Joann was seen in my office on 3/13/2025. Joann may return to work on 3/17/2025 on a limited schedule.  Until further notice, her shifts should be limited to no more than 4 hours a shift.    If you have any questions or concerns, please don't hesitate to call.         Sincerely,          Morelia Castillo D.O.

## 2025-03-13 NOTE — ASSESSMENT & PLAN NOTE
Positive blood culture while in the hospital that most likely was related to contamination.  She has been doing fine since then.

## 2025-03-13 NOTE — ASSESSMENT & PLAN NOTE
The patient will continue with the current dose of her atorvastatin.  We have made no changes today.  Orders:  •  Comprehensive metabolic panel; Future  •  CBC and differential; Future  •  LDL cholesterol, direct; Future  •  Lipid panel; Future

## 2025-03-13 NOTE — ASSESSMENT & PLAN NOTE
The patient will continue on the current dose of her levothyroxine.  We will continue to monitor her very closely.  Her thyroid levels checked in the hospital were in the normal range.  We will see her back as scheduled.

## 2025-03-13 NOTE — ASSESSMENT & PLAN NOTE
The patient is feeling much better after her hospitalization.  She is still very tired but is improving.  She will continue to stay well-hydrated and rest.  We will follow along with her very closely.  We will see her back as scheduled.

## 2025-03-13 NOTE — ASSESSMENT & PLAN NOTE
The patient did have a low potassium level while in the hospital.  We will send her for the follow-up lab work as indicated and follow-up with results.  Orders:  •  Comprehensive metabolic panel; Future

## 2025-03-19 ENCOUNTER — RESULTS FOLLOW-UP (OUTPATIENT)
Dept: FAMILY MEDICINE CLINIC | Facility: CLINIC | Age: 65
End: 2025-03-19

## 2025-03-19 DIAGNOSIS — E78.1 HYPERTRIGLYCERIDEMIA: Primary | ICD-10-CM

## 2025-03-19 LAB
ALBUMIN SERPL-MCNC: 4.1 G/DL (ref 3.6–5.1)
ALBUMIN/GLOB SERPL: 1.6 (CALC) (ref 1–2.5)
ALP SERPL-CCNC: 89 U/L (ref 37–153)
ALT SERPL-CCNC: 23 U/L (ref 6–29)
AST SERPL-CCNC: 12 U/L (ref 10–35)
BASOPHILS # BLD AUTO: 60 CELLS/UL (ref 0–200)
BASOPHILS NFR BLD AUTO: 0.7 %
BILIRUB SERPL-MCNC: 0.9 MG/DL (ref 0.2–1.2)
BUN SERPL-MCNC: 11 MG/DL (ref 7–25)
BUN/CREAT SERPL: ABNORMAL (CALC) (ref 6–22)
CALCIUM SERPL-MCNC: 9.9 MG/DL (ref 8.6–10.4)
CHLORIDE SERPL-SCNC: 97 MMOL/L (ref 98–110)
CHOLEST SERPL-MCNC: 146 MG/DL
CHOLEST/HDLC SERPL: 3 (CALC)
CO2 SERPL-SCNC: 33 MMOL/L (ref 20–32)
CREAT SERPL-MCNC: 0.55 MG/DL (ref 0.5–1.05)
EOSINOPHIL # BLD AUTO: 172 CELLS/UL (ref 15–500)
EOSINOPHIL NFR BLD AUTO: 2 %
ERYTHROCYTE [DISTWIDTH] IN BLOOD BY AUTOMATED COUNT: 14.2 % (ref 11–15)
GFR/BSA.PRED SERPLBLD CYS-BASED-ARV: 102 ML/MIN/1.73M2
GLOBULIN SER CALC-MCNC: 2.6 G/DL (CALC) (ref 1.9–3.7)
GLUCOSE SERPL-MCNC: 95 MG/DL (ref 65–99)
HCT VFR BLD AUTO: 42.4 % (ref 35–45)
HDLC SERPL-MCNC: 48 MG/DL
HGB BLD-MCNC: 13.9 G/DL (ref 11.7–15.5)
LDLC SERPL CALC-MCNC: 70 MG/DL (CALC)
LDLC SERPL DIRECT ASSAY-MCNC: 71 MG/DL
LYMPHOCYTES # BLD AUTO: 2442 CELLS/UL (ref 850–3900)
LYMPHOCYTES NFR BLD AUTO: 28.4 %
MCH RBC QN AUTO: 29 PG (ref 27–33)
MCHC RBC AUTO-ENTMCNC: 32.8 G/DL (ref 32–36)
MCV RBC AUTO: 88.3 FL (ref 80–100)
MONOCYTES # BLD AUTO: 593 CELLS/UL (ref 200–950)
MONOCYTES NFR BLD AUTO: 6.9 %
NEUTROPHILS # BLD AUTO: 5332 CELLS/UL (ref 1500–7800)
NEUTROPHILS NFR BLD AUTO: 62 %
NONHDLC SERPL-MCNC: 98 MG/DL (CALC)
PLATELET # BLD AUTO: 374 THOUSAND/UL (ref 140–400)
PMV BLD REES-ECKER: 9.9 FL (ref 7.5–12.5)
POTASSIUM SERPL-SCNC: 3.6 MMOL/L (ref 3.5–5.3)
PROT SERPL-MCNC: 6.7 G/DL (ref 6.1–8.1)
RBC # BLD AUTO: 4.8 MILLION/UL (ref 3.8–5.1)
SODIUM SERPL-SCNC: 139 MMOL/L (ref 135–146)
TRIGL SERPL-MCNC: 211 MG/DL
WBC # BLD AUTO: 8.6 THOUSAND/UL (ref 3.8–10.8)

## 2025-03-28 LAB — TRIGL SERPL-MCNC: 162 MG/DL

## 2025-04-01 NOTE — RESULT ENCOUNTER NOTE
Informed patient of her lab results. Patient would like a note for work allowing her to work 6 hrs a night for 2 weeks

## 2025-04-07 DIAGNOSIS — I10 HYPERTENSION: ICD-10-CM

## 2025-04-07 RX ORDER — LISINOPRIL 10 MG/1
10 TABLET ORAL DAILY
Qty: 30 TABLET | Refills: 0 | Status: SHIPPED | OUTPATIENT
Start: 2025-04-07

## 2025-04-07 NOTE — TELEPHONE ENCOUNTER
Medication: lisinopril (ZESTRIL)     Dose/Frequency: 10 mg    Quantity: 30 Tablet    Pharmacy: [facility] - [place], [place] - [facility] [address]      Office:   [x] PCP/Provider -   [] Speciality/Provider -     Does the patient have enough for 3 days?   [] Yes   [x] No - Send as HP to POD

## 2025-04-09 DIAGNOSIS — I10 ESSENTIAL HYPERTENSION: ICD-10-CM

## 2025-04-09 RX ORDER — HYDROCHLOROTHIAZIDE 25 MG/1
25 TABLET ORAL DAILY
Qty: 90 TABLET | Refills: 1 | Status: SHIPPED | OUTPATIENT
Start: 2025-04-09

## 2025-04-21 DIAGNOSIS — E78.5 MILD HYPERLIPIDEMIA: ICD-10-CM

## 2025-04-21 RX ORDER — ATORVASTATIN CALCIUM 20 MG/1
20 TABLET, FILM COATED ORAL DAILY
Qty: 30 TABLET | Refills: 5 | Status: SHIPPED | OUTPATIENT
Start: 2025-04-21

## 2025-05-05 DIAGNOSIS — I10 HYPERTENSION: ICD-10-CM

## 2025-05-05 RX ORDER — LISINOPRIL 10 MG/1
10 TABLET ORAL DAILY
Qty: 90 TABLET | Refills: 0 | Status: SHIPPED | OUTPATIENT
Start: 2025-05-05

## 2025-05-05 NOTE — TELEPHONE ENCOUNTER
Medication:     lisinopril (ZESTRIL) 10 mg tablet       Dose/Frequency: Take 1 tablet (10 mg total) by mouth daily     Quantity: 90 day    Pharmacy: [facility]    Office:   [] PCP/Provider -   [x] Speciality/Provider - was switched to this medication while she was in the hospital    Does the patient have enough for 3 days?   [] Yes   [x] No - Send as HP to POD

## 2025-05-11 DIAGNOSIS — E89.0 POSTOPERATIVE HYPOTHYROIDISM: ICD-10-CM

## 2025-05-11 RX ORDER — LEVOTHYROXINE SODIUM 125 UG/1
125 TABLET ORAL DAILY
Qty: 90 TABLET | Refills: 1 | Status: SHIPPED | OUTPATIENT
Start: 2025-05-11

## 2025-08-01 DIAGNOSIS — I10 HYPERTENSION: ICD-10-CM

## 2025-08-04 RX ORDER — LISINOPRIL 10 MG/1
10 TABLET ORAL DAILY
Qty: 90 TABLET | Refills: 1 | Status: SHIPPED | OUTPATIENT
Start: 2025-08-04